# Patient Record
Sex: MALE | Race: WHITE | NOT HISPANIC OR LATINO | Employment: OTHER | URBAN - METROPOLITAN AREA
[De-identification: names, ages, dates, MRNs, and addresses within clinical notes are randomized per-mention and may not be internally consistent; named-entity substitution may affect disease eponyms.]

---

## 2023-01-31 PROBLEM — R73.9 BORDERLINE HYPERGLYCEMIA: Status: ACTIVE | Noted: 2023-01-31

## 2023-01-31 PROBLEM — M51.9 LUMBAR DISC DISEASE: Status: ACTIVE | Noted: 2023-01-31

## 2023-01-31 PROBLEM — E78.5 HYPERLIPIDEMIA: Status: ACTIVE | Noted: 2023-01-31

## 2023-01-31 PROBLEM — L72.3 SEBACEOUS CYST: Status: ACTIVE | Noted: 2023-01-31

## 2023-01-31 PROBLEM — D12.6 COLON ADENOMAS: Status: ACTIVE | Noted: 2023-01-31

## 2023-01-31 PROBLEM — I10 BENIGN ESSENTIAL HYPERTENSION: Status: ACTIVE | Noted: 2023-01-31

## 2023-01-31 PROBLEM — E78.00 HYPERCHOLESTEROLEMIA: Status: ACTIVE | Noted: 2023-01-31

## 2023-01-31 PROBLEM — M67.40 GANGLION CYST: Status: ACTIVE | Noted: 2023-01-31

## 2023-01-31 PROBLEM — T14.8XXA MUSCLE STRAIN: Status: ACTIVE | Noted: 2023-01-31

## 2023-01-31 PROBLEM — M54.16 LUMBAR RADICULOPATHY: Status: ACTIVE | Noted: 2023-01-31

## 2023-01-31 PROBLEM — N52.9 MALE ERECTILE DISORDER OF ORGANIC ORIGIN: Status: ACTIVE | Noted: 2023-01-31

## 2023-01-31 PROBLEM — K42.9 UMBILICAL HERNIA: Status: ACTIVE | Noted: 2023-01-31

## 2023-01-31 PROBLEM — M19.90 OSTEOARTHRITIS: Status: ACTIVE | Noted: 2023-01-31

## 2023-01-31 PROBLEM — N40.0 BPH (BENIGN PROSTATIC HYPERPLASIA): Status: ACTIVE | Noted: 2023-01-31

## 2023-01-31 PROBLEM — M54.32 SCIATIC PAIN, LEFT: Status: ACTIVE | Noted: 2023-01-31

## 2023-01-31 RX ORDER — LISINOPRIL 10 MG/1
10 TABLET ORAL DAILY
COMMUNITY
Start: 2014-05-16 | End: 2023-03-15 | Stop reason: SDUPTHER

## 2023-01-31 RX ORDER — CIPROFLOXACIN 500 MG/1
500 TABLET ORAL
COMMUNITY
End: 2023-03-15 | Stop reason: ALTCHOICE

## 2023-01-31 RX ORDER — TADALAFIL 20 MG/1
20 TABLET ORAL DAILY PRN
COMMUNITY
End: 2024-01-25 | Stop reason: SDUPTHER

## 2023-03-15 ENCOUNTER — OFFICE VISIT (OUTPATIENT)
Dept: PRIMARY CARE | Facility: CLINIC | Age: 73
End: 2023-03-15
Payer: MEDICARE

## 2023-03-15 VITALS
TEMPERATURE: 96.6 F | BODY MASS INDEX: 25.86 KG/M2 | WEIGHT: 208 LBS | RESPIRATION RATE: 15 BRPM | SYSTOLIC BLOOD PRESSURE: 122 MMHG | HEART RATE: 67 BPM | OXYGEN SATURATION: 98 % | DIASTOLIC BLOOD PRESSURE: 72 MMHG | HEIGHT: 75 IN

## 2023-03-15 DIAGNOSIS — N52.9 MALE ERECTILE DISORDER OF ORGANIC ORIGIN: ICD-10-CM

## 2023-03-15 DIAGNOSIS — I10 BENIGN ESSENTIAL HYPERTENSION: ICD-10-CM

## 2023-03-15 DIAGNOSIS — Z00.00 MEDICARE ANNUAL WELLNESS VISIT, SUBSEQUENT: Primary | ICD-10-CM

## 2023-03-15 DIAGNOSIS — C61 PROSTATE CANCER (MULTI): Chronic | ICD-10-CM

## 2023-03-15 DIAGNOSIS — E78.2 MIXED HYPERLIPIDEMIA: ICD-10-CM

## 2023-03-15 PROCEDURE — 1170F FXNL STATUS ASSESSED: CPT | Performed by: STUDENT IN AN ORGANIZED HEALTH CARE EDUCATION/TRAINING PROGRAM

## 2023-03-15 PROCEDURE — 99214 OFFICE O/P EST MOD 30 MIN: CPT | Performed by: STUDENT IN AN ORGANIZED HEALTH CARE EDUCATION/TRAINING PROGRAM

## 2023-03-15 PROCEDURE — 1160F RVW MEDS BY RX/DR IN RCRD: CPT | Performed by: STUDENT IN AN ORGANIZED HEALTH CARE EDUCATION/TRAINING PROGRAM

## 2023-03-15 PROCEDURE — 1157F ADVNC CARE PLAN IN RCRD: CPT | Performed by: STUDENT IN AN ORGANIZED HEALTH CARE EDUCATION/TRAINING PROGRAM

## 2023-03-15 PROCEDURE — 1036F TOBACCO NON-USER: CPT | Performed by: STUDENT IN AN ORGANIZED HEALTH CARE EDUCATION/TRAINING PROGRAM

## 2023-03-15 PROCEDURE — 3074F SYST BP LT 130 MM HG: CPT | Performed by: STUDENT IN AN ORGANIZED HEALTH CARE EDUCATION/TRAINING PROGRAM

## 2023-03-15 PROCEDURE — G0439 PPPS, SUBSEQ VISIT: HCPCS | Performed by: STUDENT IN AN ORGANIZED HEALTH CARE EDUCATION/TRAINING PROGRAM

## 2023-03-15 PROCEDURE — 1159F MED LIST DOCD IN RCRD: CPT | Performed by: STUDENT IN AN ORGANIZED HEALTH CARE EDUCATION/TRAINING PROGRAM

## 2023-03-15 PROCEDURE — 3078F DIAST BP <80 MM HG: CPT | Performed by: STUDENT IN AN ORGANIZED HEALTH CARE EDUCATION/TRAINING PROGRAM

## 2023-03-15 RX ORDER — LISINOPRIL 10 MG/1
10 TABLET ORAL DAILY
Qty: 90 TABLET | Refills: 1 | Status: SHIPPED | OUTPATIENT
Start: 2023-03-15 | End: 2023-09-12 | Stop reason: SDUPTHER

## 2023-03-15 ASSESSMENT — ENCOUNTER SYMPTOMS
SHORTNESS OF BREATH: 0
FATIGUE: 0
LOSS OF SENSATION IN FEET: 0
ABDOMINAL PAIN: 0
DEPRESSION: 0
FEVER: 0
DIARRHEA: 0
DIZZINESS: 0
OCCASIONAL FEELINGS OF UNSTEADINESS: 0
HYPERTENSION: 1

## 2023-03-15 ASSESSMENT — ACTIVITIES OF DAILY LIVING (ADL)
DRESSING: INDEPENDENT
MANAGING_FINANCES: INDEPENDENT
DOING_HOUSEWORK: INDEPENDENT
BATHING: INDEPENDENT
TAKING_MEDICATION: INDEPENDENT
GROCERY_SHOPPING: INDEPENDENT

## 2023-03-15 ASSESSMENT — PATIENT HEALTH QUESTIONNAIRE - PHQ9
2. FEELING DOWN, DEPRESSED OR HOPELESS: NOT AT ALL
1. LITTLE INTEREST OR PLEASURE IN DOING THINGS: NOT AT ALL
SUM OF ALL RESPONSES TO PHQ9 QUESTIONS 1 AND 2: 0

## 2023-03-15 NOTE — PROGRESS NOTES
"Subjective   Reason for Visit: Faustino Isaacs Jr. is an 72 y.o. male here for a Medicare Wellness visit.     Past Medical, Surgical, and Family History reviewed and updated in chart.    Reviewed all medications by prescribing practitioner or clinical pharmacist (such as prescriptions, OTCs, herbal therapies and supplements) and documented in the medical record.    Interval Hx: PSA was trickling upward  S/p BX which was positive: Prostatic adenocarcinoma, which involved 70% of the sample  Pending repeat PSA in 3 months   Following with Dr. Ghandour     Hypertension  This is a chronic problem. The current episode started more than 1 year ago. The problem is unchanged. The problem is controlled. Pertinent negatives include no chest pain or shortness of breath.       Patient Self Assessment of Health Status  Patient Self Assessment: Good    Nutrition and Exercise  Current Diet: Well Balanced Diet  Adequate Fluid Intake: Yes  Caffeine: Yes (1 cup in the AM)  Exercise Frequency: Regularly    Functional Ability/Level of Safety  Cognitive Impairment Observed: No cognitive impairment observed  Cognitive Impairment Reported: No cognitive impairment reported by patient or family    Home Safety Risk Factors: None    Patient Care Team:  Susy Mason DO as PCP - General  Susy Mason DO as PCP - [employer] PCP     Review of Systems   Constitutional:  Negative for fatigue and fever.   HENT:  Negative for congestion and ear pain.    Respiratory:  Negative for shortness of breath.    Cardiovascular:  Negative for chest pain.   Gastrointestinal:  Negative for abdominal pain and diarrhea.   Neurological:  Negative for dizziness.       Objective   Vitals:  /72   Pulse 67   Temp 35.9 °C (96.6 °F)   Resp 15   Ht 1.905 m (6' 3\")   Wt 94.3 kg (208 lb)   SpO2 98%   BMI 26.00 kg/m²       Physical Exam  Vitals reviewed.   Constitutional:       General: He is not in acute distress.     Appearance: He is not " ill-appearing.   HENT:      Right Ear: Tympanic membrane and ear canal normal.      Left Ear: Tympanic membrane and ear canal normal.      Mouth/Throat:      Mouth: Mucous membranes are moist.      Pharynx: Oropharynx is clear. No oropharyngeal exudate or posterior oropharyngeal erythema.   Eyes:      Extraocular Movements: Extraocular movements intact.      Conjunctiva/sclera: Conjunctivae normal.      Pupils: Pupils are equal, round, and reactive to light.   Neck:      Vascular: No carotid bruit.   Cardiovascular:      Rate and Rhythm: Normal rate and regular rhythm.      Heart sounds: Murmur (SYSTOLIC) heard.      No gallop.   Pulmonary:      Effort: Pulmonary effort is normal.      Breath sounds: Normal breath sounds. No wheezing, rhonchi or rales.   Abdominal:      General: Abdomen is flat. Bowel sounds are normal.      Palpations: Abdomen is soft.      Tenderness: There is no abdominal tenderness.   Musculoskeletal:      Cervical back: Neck supple.      Left lower leg: No edema.   Skin:     General: Skin is warm and dry.      Findings: No rash.   Neurological:      General: No focal deficit present.      Mental Status: He is alert and oriented to person, place, and time.      Gait: Gait normal.   Psychiatric:         Mood and Affect: Mood normal.         Behavior: Behavior normal.         Assessment/Plan   Problem List Items Addressed This Visit          Circulatory    Benign essential hypertension    Current Assessment & Plan     Doing well  Stable, continue current medications          Relevant Medications    lisinopril 10 mg tablet    Other Relevant Orders    CBC and Auto Differential    Comprehensive metabolic panel    Albumin , Urine Random       Genitourinary    Prostate cancer (CMS/HCC) (Chronic)    Overview     Bx postiive for adenocarcinoma of the prostate   following with urology   pending repeat PSA         Male erectile disorder of organic origin       Other    Hyperlipidemia    Relevant Orders     Lipid Panel    Medicare annual wellness visit, subsequent - Primary

## 2023-03-15 NOTE — PROGRESS NOTES
"Subjective   Reason for Visit: Faustino Isaacs Jr. is an 72 y.o. male here for a Medicare Wellness visit.     Past Medical, Surgical, and Family History reviewed and updated in chart.    Reviewed all medications by prescribing practitioner or clinical pharmacist (such as prescriptions, OTCs, herbal therapies and supplements) and documented in the medical record.    HPI    Patient Self Assessment of Health Status  Patient Self Assessment: Good    Nutrition and Exercise  Current Diet: Well Balanced Diet  Adequate Fluid Intake: Yes  Caffeine: Yes (1 cup in the AM)  Exercise Frequency: Regularly    Functional Ability/Level of Safety  Cognitive Impairment Observed: No cognitive impairment observed  Cognitive Impairment Reported: No cognitive impairment reported by patient or family    Home Safety Risk Factors: None    Patient Care Team:  Susy Mason DO as PCP - General  Susy Mason DO as PCP - [employer] PCP     Review of Systems    Objective   Vitals:  /72   Pulse 67   Temp 35.9 °C (96.6 °F)   Resp 15   Ht 1.905 m (6' 3\")   Wt 94.3 kg (208 lb)   SpO2 98%   BMI 26.00 kg/m²       Physical Exam    Assessment/Plan   Problem List Items Addressed This Visit        Circulatory    Benign essential hypertension    Current Assessment & Plan     Doing well  Stable, continue current medications          Relevant Medications    lisinopril 10 mg tablet    Other Relevant Orders    CBC and Auto Differential    Comprehensive metabolic panel    Albumin , Urine Random       Genitourinary    Prostate cancer (CMS/HCC) (Chronic)    Overview     Bx postiive for adenocarcinoma of the prostate   following with urology   pending repeat PSA         Male erectile disorder of organic origin       Other    Hyperlipidemia    Relevant Orders    Lipid Panel    Medicare annual wellness visit, subsequent - Primary          "

## 2023-03-16 ENCOUNTER — LAB (OUTPATIENT)
Dept: LAB | Facility: LAB | Age: 73
End: 2023-03-16
Payer: MEDICARE

## 2023-03-16 ENCOUNTER — TELEPHONE (OUTPATIENT)
Dept: PRIMARY CARE | Facility: CLINIC | Age: 73
End: 2023-03-16

## 2023-03-16 DIAGNOSIS — I10 BENIGN ESSENTIAL HYPERTENSION: ICD-10-CM

## 2023-03-16 DIAGNOSIS — E78.2 MIXED HYPERLIPIDEMIA: ICD-10-CM

## 2023-03-16 LAB
ALANINE AMINOTRANSFERASE (SGPT) (U/L) IN SER/PLAS: 19 U/L (ref 10–52)
ALBUMIN (G/DL) IN SER/PLAS: 4.6 G/DL (ref 3.4–5)
ALBUMIN (MG/L) IN URINE: <7 MG/L
ALBUMIN/CREATININE (UG/MG) IN URINE: NORMAL UG/MG CRT (ref 0–30)
ALKALINE PHOSPHATASE (U/L) IN SER/PLAS: 69 U/L (ref 33–136)
ANION GAP IN SER/PLAS: 12 MMOL/L (ref 10–20)
ASPARTATE AMINOTRANSFERASE (SGOT) (U/L) IN SER/PLAS: 22 U/L (ref 9–39)
BASOPHILS (10*3/UL) IN BLOOD BY AUTOMATED COUNT: 0.01 X10E9/L (ref 0–0.1)
BASOPHILS/100 LEUKOCYTES IN BLOOD BY AUTOMATED COUNT: 0.2 % (ref 0–2)
BILIRUBIN TOTAL (MG/DL) IN SER/PLAS: 0.7 MG/DL (ref 0–1.2)
CALCIUM (MG/DL) IN SER/PLAS: 9.7 MG/DL (ref 8.6–10.6)
CARBON DIOXIDE, TOTAL (MMOL/L) IN SER/PLAS: 29 MMOL/L (ref 21–32)
CHLORIDE (MMOL/L) IN SER/PLAS: 105 MMOL/L (ref 98–107)
CHOLESTEROL (MG/DL) IN SER/PLAS: 188 MG/DL (ref 0–199)
CHOLESTEROL IN HDL (MG/DL) IN SER/PLAS: 47.2 MG/DL
CHOLESTEROL/HDL RATIO: 4
CREATININE (MG/DL) IN SER/PLAS: 0.94 MG/DL (ref 0.5–1.3)
CREATININE (MG/DL) IN URINE: 68.1 MG/DL (ref 20–370)
EOSINOPHILS (10*3/UL) IN BLOOD BY AUTOMATED COUNT: 0.08 X10E9/L (ref 0–0.4)
EOSINOPHILS/100 LEUKOCYTES IN BLOOD BY AUTOMATED COUNT: 1.7 % (ref 0–6)
ERYTHROCYTE DISTRIBUTION WIDTH (RATIO) BY AUTOMATED COUNT: 11.9 % (ref 11.5–14.5)
ERYTHROCYTE MEAN CORPUSCULAR HEMOGLOBIN CONCENTRATION (G/DL) BY AUTOMATED: 33.7 G/DL (ref 32–36)
ERYTHROCYTE MEAN CORPUSCULAR VOLUME (FL) BY AUTOMATED COUNT: 99 FL (ref 80–100)
ERYTHROCYTES (10*6/UL) IN BLOOD BY AUTOMATED COUNT: 4.12 X10E12/L (ref 4.5–5.9)
GFR MALE: 86 ML/MIN/1.73M2
GLUCOSE (MG/DL) IN SER/PLAS: 96 MG/DL (ref 74–99)
HEMATOCRIT (%) IN BLOOD BY AUTOMATED COUNT: 40.7 % (ref 41–52)
HEMOGLOBIN (G/DL) IN BLOOD: 13.7 G/DL (ref 13.5–17.5)
IMMATURE GRANULOCYTES/100 LEUKOCYTES IN BLOOD BY AUTOMATED COUNT: 0.2 % (ref 0–0.9)
LDL: 118 MG/DL (ref 0–99)
LEUKOCYTES (10*3/UL) IN BLOOD BY AUTOMATED COUNT: 4.6 X10E9/L (ref 4.4–11.3)
LYMPHOCYTES (10*3/UL) IN BLOOD BY AUTOMATED COUNT: 1.48 X10E9/L (ref 0.8–3)
LYMPHOCYTES/100 LEUKOCYTES IN BLOOD BY AUTOMATED COUNT: 32.2 % (ref 13–44)
MONOCYTES (10*3/UL) IN BLOOD BY AUTOMATED COUNT: 0.48 X10E9/L (ref 0.05–0.8)
MONOCYTES/100 LEUKOCYTES IN BLOOD BY AUTOMATED COUNT: 10.4 % (ref 2–10)
NEUTROPHILS (10*3/UL) IN BLOOD BY AUTOMATED COUNT: 2.54 X10E9/L (ref 1.6–5.5)
NEUTROPHILS/100 LEUKOCYTES IN BLOOD BY AUTOMATED COUNT: 55.3 % (ref 40–80)
NRBC (PER 100 WBCS) BY AUTOMATED COUNT: 0 /100 WBC (ref 0–0)
PLATELETS (10*3/UL) IN BLOOD AUTOMATED COUNT: 239 X10E9/L (ref 150–450)
POTASSIUM (MMOL/L) IN SER/PLAS: 4.6 MMOL/L (ref 3.5–5.3)
PROTEIN TOTAL: 7.1 G/DL (ref 6.4–8.2)
SODIUM (MMOL/L) IN SER/PLAS: 141 MMOL/L (ref 136–145)
TRIGLYCERIDE (MG/DL) IN SER/PLAS: 112 MG/DL (ref 0–149)
UREA NITROGEN (MG/DL) IN SER/PLAS: 22 MG/DL (ref 6–23)
VLDL: 22 MG/DL (ref 0–40)

## 2023-03-16 PROCEDURE — 82570 ASSAY OF URINE CREATININE: CPT

## 2023-03-16 PROCEDURE — 85025 COMPLETE CBC W/AUTO DIFF WBC: CPT

## 2023-03-16 PROCEDURE — 80061 LIPID PANEL: CPT

## 2023-03-16 PROCEDURE — 82043 UR ALBUMIN QUANTITATIVE: CPT

## 2023-03-16 PROCEDURE — 36415 COLL VENOUS BLD VENIPUNCTURE: CPT

## 2023-03-16 PROCEDURE — 80053 COMPREHEN METABOLIC PANEL: CPT

## 2023-03-16 NOTE — TELEPHONE ENCOUNTER
----- Message from Susy Mason DO sent at 3/16/2023  3:02 PM EDT -----  Blood work looks good! Cholesterol, kidney, liver numbers are normal

## 2023-03-21 ENCOUNTER — TELEPHONE (OUTPATIENT)
Dept: PRIMARY CARE | Facility: CLINIC | Age: 73
End: 2023-03-21
Payer: MEDICARE

## 2023-05-30 LAB — PROSTATE SPECIFIC AG (NG/ML) IN SER/PLAS: 1.3 NG/ML (ref 0–4)

## 2023-09-12 DIAGNOSIS — I10 BENIGN ESSENTIAL HYPERTENSION: ICD-10-CM

## 2023-09-12 RX ORDER — LISINOPRIL 10 MG/1
10 TABLET ORAL DAILY
Qty: 90 TABLET | Refills: 1 | Status: SHIPPED | OUTPATIENT
Start: 2023-09-12 | End: 2023-09-18 | Stop reason: SDUPTHER

## 2023-09-13 PROBLEM — R97.20 ELEVATED PSA: Status: ACTIVE | Noted: 2023-09-13

## 2023-09-13 PROBLEM — C61 PROSTATE CANCER (MULTI): Status: ACTIVE | Noted: 2023-03-15

## 2023-09-18 ENCOUNTER — OFFICE VISIT (OUTPATIENT)
Dept: PRIMARY CARE | Facility: CLINIC | Age: 73
End: 2023-09-18
Payer: MEDICARE

## 2023-09-18 ENCOUNTER — LAB (OUTPATIENT)
Dept: LAB | Facility: LAB | Age: 73
End: 2023-09-18
Payer: MEDICARE

## 2023-09-18 VITALS
HEIGHT: 76 IN | OXYGEN SATURATION: 96 % | WEIGHT: 207.6 LBS | HEART RATE: 50 BPM | BODY MASS INDEX: 25.28 KG/M2 | DIASTOLIC BLOOD PRESSURE: 66 MMHG | RESPIRATION RATE: 18 BRPM | SYSTOLIC BLOOD PRESSURE: 126 MMHG

## 2023-09-18 DIAGNOSIS — I10 BENIGN ESSENTIAL HYPERTENSION: ICD-10-CM

## 2023-09-18 DIAGNOSIS — E78.00 HYPERCHOLESTEROLEMIA: ICD-10-CM

## 2023-09-18 DIAGNOSIS — Z23 ENCOUNTER FOR IMMUNIZATION: ICD-10-CM

## 2023-09-18 DIAGNOSIS — I10 BENIGN ESSENTIAL HYPERTENSION: Primary | ICD-10-CM

## 2023-09-18 LAB
ALANINE AMINOTRANSFERASE (SGPT) (U/L) IN SER/PLAS: 23 U/L (ref 10–52)
ALBUMIN (G/DL) IN SER/PLAS: 4.7 G/DL (ref 3.4–5)
ALKALINE PHOSPHATASE (U/L) IN SER/PLAS: 68 U/L (ref 33–136)
ANION GAP IN SER/PLAS: 13 MMOL/L (ref 10–20)
ASPARTATE AMINOTRANSFERASE (SGOT) (U/L) IN SER/PLAS: 27 U/L (ref 9–39)
BASOPHILS (10*3/UL) IN BLOOD BY AUTOMATED COUNT: 0.01 X10E9/L (ref 0–0.1)
BASOPHILS/100 LEUKOCYTES IN BLOOD BY AUTOMATED COUNT: 0.2 % (ref 0–2)
BILIRUBIN TOTAL (MG/DL) IN SER/PLAS: 0.8 MG/DL (ref 0–1.2)
CALCIUM (MG/DL) IN SER/PLAS: 9.9 MG/DL (ref 8.6–10.3)
CARBON DIOXIDE, TOTAL (MMOL/L) IN SER/PLAS: 29 MMOL/L (ref 21–32)
CHLORIDE (MMOL/L) IN SER/PLAS: 101 MMOL/L (ref 98–107)
CREATININE (MG/DL) IN SER/PLAS: 0.89 MG/DL (ref 0.5–1.3)
EOSINOPHILS (10*3/UL) IN BLOOD BY AUTOMATED COUNT: 0.09 X10E9/L (ref 0–0.4)
EOSINOPHILS/100 LEUKOCYTES IN BLOOD BY AUTOMATED COUNT: 2.2 % (ref 0–6)
ERYTHROCYTE DISTRIBUTION WIDTH (RATIO) BY AUTOMATED COUNT: 12.4 % (ref 11.5–14.5)
ERYTHROCYTE MEAN CORPUSCULAR HEMOGLOBIN CONCENTRATION (G/DL) BY AUTOMATED: 33.1 G/DL (ref 32–36)
ERYTHROCYTE MEAN CORPUSCULAR VOLUME (FL) BY AUTOMATED COUNT: 98 FL (ref 80–100)
ERYTHROCYTES (10*6/UL) IN BLOOD BY AUTOMATED COUNT: 4.18 X10E12/L (ref 4.5–5.9)
GFR MALE: 90 ML/MIN/1.73M2
GLUCOSE (MG/DL) IN SER/PLAS: 109 MG/DL (ref 74–99)
HEMATOCRIT (%) IN BLOOD BY AUTOMATED COUNT: 41.1 % (ref 41–52)
HEMOGLOBIN (G/DL) IN BLOOD: 13.6 G/DL (ref 13.5–17.5)
IMMATURE GRANULOCYTES/100 LEUKOCYTES IN BLOOD BY AUTOMATED COUNT: 0.2 % (ref 0–0.9)
LEUKOCYTES (10*3/UL) IN BLOOD BY AUTOMATED COUNT: 4.1 X10E9/L (ref 4.4–11.3)
LYMPHOCYTES (10*3/UL) IN BLOOD BY AUTOMATED COUNT: 1.11 X10E9/L (ref 0.8–3)
LYMPHOCYTES/100 LEUKOCYTES IN BLOOD BY AUTOMATED COUNT: 26.9 % (ref 13–44)
MONOCYTES (10*3/UL) IN BLOOD BY AUTOMATED COUNT: 0.4 X10E9/L (ref 0.05–0.8)
MONOCYTES/100 LEUKOCYTES IN BLOOD BY AUTOMATED COUNT: 9.7 % (ref 2–10)
NEUTROPHILS (10*3/UL) IN BLOOD BY AUTOMATED COUNT: 2.51 X10E9/L (ref 1.6–5.5)
NEUTROPHILS/100 LEUKOCYTES IN BLOOD BY AUTOMATED COUNT: 60.8 % (ref 40–80)
PLATELETS (10*3/UL) IN BLOOD AUTOMATED COUNT: 253 X10E9/L (ref 150–450)
POTASSIUM (MMOL/L) IN SER/PLAS: 4.9 MMOL/L (ref 3.5–5.3)
PROTEIN TOTAL: 7.2 G/DL (ref 6.4–8.2)
SODIUM (MMOL/L) IN SER/PLAS: 138 MMOL/L (ref 136–145)
UREA NITROGEN (MG/DL) IN SER/PLAS: 18 MG/DL (ref 6–23)

## 2023-09-18 PROCEDURE — 3074F SYST BP LT 130 MM HG: CPT | Performed by: STUDENT IN AN ORGANIZED HEALTH CARE EDUCATION/TRAINING PROGRAM

## 2023-09-18 PROCEDURE — 1036F TOBACCO NON-USER: CPT | Performed by: STUDENT IN AN ORGANIZED HEALTH CARE EDUCATION/TRAINING PROGRAM

## 2023-09-18 PROCEDURE — 1160F RVW MEDS BY RX/DR IN RCRD: CPT | Performed by: STUDENT IN AN ORGANIZED HEALTH CARE EDUCATION/TRAINING PROGRAM

## 2023-09-18 PROCEDURE — 80053 COMPREHEN METABOLIC PANEL: CPT

## 2023-09-18 PROCEDURE — 99213 OFFICE O/P EST LOW 20 MIN: CPT | Performed by: STUDENT IN AN ORGANIZED HEALTH CARE EDUCATION/TRAINING PROGRAM

## 2023-09-18 PROCEDURE — 3078F DIAST BP <80 MM HG: CPT | Performed by: STUDENT IN AN ORGANIZED HEALTH CARE EDUCATION/TRAINING PROGRAM

## 2023-09-18 PROCEDURE — 90662 IIV NO PRSV INCREASED AG IM: CPT | Performed by: STUDENT IN AN ORGANIZED HEALTH CARE EDUCATION/TRAINING PROGRAM

## 2023-09-18 PROCEDURE — 36415 COLL VENOUS BLD VENIPUNCTURE: CPT

## 2023-09-18 PROCEDURE — 1157F ADVNC CARE PLAN IN RCRD: CPT | Performed by: STUDENT IN AN ORGANIZED HEALTH CARE EDUCATION/TRAINING PROGRAM

## 2023-09-18 PROCEDURE — G0008 ADMIN INFLUENZA VIRUS VAC: HCPCS | Performed by: STUDENT IN AN ORGANIZED HEALTH CARE EDUCATION/TRAINING PROGRAM

## 2023-09-18 PROCEDURE — 1159F MED LIST DOCD IN RCRD: CPT | Performed by: STUDENT IN AN ORGANIZED HEALTH CARE EDUCATION/TRAINING PROGRAM

## 2023-09-18 PROCEDURE — 85025 COMPLETE CBC W/AUTO DIFF WBC: CPT

## 2023-09-18 RX ORDER — LISINOPRIL 10 MG/1
10 TABLET ORAL DAILY
Qty: 90 TABLET | Refills: 1 | Status: SHIPPED | OUTPATIENT
Start: 2023-09-18 | End: 2024-03-19 | Stop reason: SDUPTHER

## 2023-09-18 ASSESSMENT — ENCOUNTER SYMPTOMS
HYPERTENSION: 1
SHORTNESS OF BREATH: 0
CONSTIPATION: 0
DIARRHEA: 0

## 2023-09-18 ASSESSMENT — PATIENT HEALTH QUESTIONNAIRE - PHQ9
1. LITTLE INTEREST OR PLEASURE IN DOING THINGS: NOT AT ALL
2. FEELING DOWN, DEPRESSED OR HOPELESS: NOT AT ALL
SUM OF ALL RESPONSES TO PHQ9 QUESTIONS 1 AND 2: 0

## 2023-09-18 NOTE — PROGRESS NOTES
Subjective   Patient ID: Faustino Isaacs Jr. is a 73 y.o. male who presents for Hypertension (Pt is here for a 6 month HTN follow up.).    Patient presents to the office today for hypertension follow-up.  Patient states that he has no concerns or questions.  He has not had any dizziness, chest pain, shortness of breath and his blood pressure has remained relatively stable.  Patient will be having an MRI of his prostate due to a fluctuation in his PSA at the end of the year with urology.    Hypertension  Pertinent negatives include no shortness of breath.       Review of Systems   HENT:  Negative for congestion.    Respiratory:  Negative for shortness of breath.    Gastrointestinal:  Negative for constipation and diarrhea.       Objective   Physical Exam  Vitals reviewed.   Constitutional:       Appearance: Normal appearance.   Cardiovascular:      Rate and Rhythm: Normal rate and regular rhythm.      Heart sounds: No murmur heard.  Pulmonary:      Effort: Pulmonary effort is normal. No respiratory distress.      Breath sounds: Normal breath sounds. No wheezing.   Musculoskeletal:      Cervical back: Neck supple.      Left lower leg: No edema.   Neurological:      Mental Status: He is alert.       Assessment/Plan   Problem List Items Addressed This Visit       Benign essential hypertension - Primary     Lisinopril 10mg daily   Physical exam was stable. Discussed maintaining diets such as DASH to help maintain normal Blood pressure. Encouraged regular exercise for a total of 120 min weekly. We will fu labs in 1-3 days. For now there will be no change in medical management. All questions and concerns were addressed. Pt will follow up in 4-6 months or sooner if needed.            Relevant Medications    lisinopril 10 mg tablet    Other Relevant Orders    CBC and Auto Differential    Comprehensive metabolic panel    Hypercholesterolemia     Doing well             Other Visit Diagnoses       Encounter for immunization         Relevant Orders    Flu vaccine, quadrivalent, high-dose, preservative free, age 65y+ (FLUZONE)

## 2023-09-18 NOTE — ASSESSMENT & PLAN NOTE
Lisinopril 10mg daily   Physical exam was stable. Discussed maintaining diets such as DASH to help maintain normal Blood pressure. Encouraged regular exercise for a total of 120 min weekly. We will fu labs in 1-3 days. For now there will be no change in medical management. All questions and concerns were addressed. Pt will follow up in 4-6 months or sooner if needed.

## 2023-11-27 ENCOUNTER — HOSPITAL ENCOUNTER (OUTPATIENT)
Dept: RADIOLOGY | Facility: HOSPITAL | Age: 73
Discharge: HOME | End: 2023-11-27
Payer: MEDICARE

## 2023-11-27 DIAGNOSIS — R97.20 ELEVATED PROSTATE SPECIFIC ANTIGEN (PSA): ICD-10-CM

## 2023-11-27 PROCEDURE — A9575 INJ GADOTERATE MEGLUMI 0.1ML: HCPCS | Performed by: STUDENT IN AN ORGANIZED HEALTH CARE EDUCATION/TRAINING PROGRAM

## 2023-11-27 PROCEDURE — 72197 MRI PELVIS W/O & W/DYE: CPT

## 2023-11-27 PROCEDURE — 76498 UNLISTED MR PROCEDURE: CPT | Performed by: STUDENT IN AN ORGANIZED HEALTH CARE EDUCATION/TRAINING PROGRAM

## 2023-11-27 PROCEDURE — 2550000001 HC RX 255 CONTRASTS: Performed by: STUDENT IN AN ORGANIZED HEALTH CARE EDUCATION/TRAINING PROGRAM

## 2023-11-27 PROCEDURE — 72197 MRI PELVIS W/O & W/DYE: CPT | Performed by: STUDENT IN AN ORGANIZED HEALTH CARE EDUCATION/TRAINING PROGRAM

## 2023-11-27 RX ORDER — GADOTERATE MEGLUMINE 376.9 MG/ML
0.2 INJECTION INTRAVENOUS
Status: COMPLETED | OUTPATIENT
Start: 2023-11-27 | End: 2023-11-27

## 2023-11-27 RX ADMIN — GADOTERATE MEGLUMINE 18 ML: 376.9 INJECTION INTRAVENOUS at 10:30

## 2023-12-01 ENCOUNTER — LAB (OUTPATIENT)
Dept: LAB | Facility: LAB | Age: 73
End: 2023-12-01
Payer: MEDICARE

## 2023-12-01 DIAGNOSIS — R97.20 ELEVATED PROSTATE SPECIFIC ANTIGEN (PSA): Primary | ICD-10-CM

## 2023-12-01 LAB — PSA SERPL-MCNC: 2.18 NG/ML

## 2023-12-01 PROCEDURE — 84153 ASSAY OF PSA TOTAL: CPT

## 2023-12-01 PROCEDURE — 36415 COLL VENOUS BLD VENIPUNCTURE: CPT

## 2023-12-11 ENCOUNTER — APPOINTMENT (OUTPATIENT)
Dept: UROLOGY | Facility: CLINIC | Age: 73
End: 2023-12-11
Payer: MEDICARE

## 2023-12-18 ENCOUNTER — OFFICE VISIT (OUTPATIENT)
Dept: UROLOGY | Facility: CLINIC | Age: 73
End: 2023-12-18
Payer: MEDICARE

## 2023-12-18 DIAGNOSIS — N40.1 BENIGN PROSTATIC HYPERPLASIA WITH LOWER URINARY TRACT SYMPTOMS, SYMPTOM DETAILS UNSPECIFIED: ICD-10-CM

## 2023-12-18 DIAGNOSIS — R97.20 ELEVATED PSA: ICD-10-CM

## 2023-12-18 DIAGNOSIS — N52.9 ERECTILE DYSFUNCTION, UNSPECIFIED ERECTILE DYSFUNCTION TYPE: ICD-10-CM

## 2023-12-18 DIAGNOSIS — C61 PROSTATE CANCER (MULTI): Primary | ICD-10-CM

## 2023-12-18 PROCEDURE — 1036F TOBACCO NON-USER: CPT | Performed by: STUDENT IN AN ORGANIZED HEALTH CARE EDUCATION/TRAINING PROGRAM

## 2023-12-18 PROCEDURE — 99214 OFFICE O/P EST MOD 30 MIN: CPT | Performed by: STUDENT IN AN ORGANIZED HEALTH CARE EDUCATION/TRAINING PROGRAM

## 2023-12-18 PROCEDURE — 1160F RVW MEDS BY RX/DR IN RCRD: CPT | Performed by: STUDENT IN AN ORGANIZED HEALTH CARE EDUCATION/TRAINING PROGRAM

## 2023-12-18 PROCEDURE — 1159F MED LIST DOCD IN RCRD: CPT | Performed by: STUDENT IN AN ORGANIZED HEALTH CARE EDUCATION/TRAINING PROGRAM

## 2023-12-18 RX ORDER — TADALAFIL 5 MG/1
5 TABLET ORAL DAILY
Qty: 30 TABLET | Refills: 11 | Status: SHIPPED | OUTPATIENT
Start: 2023-12-18 | End: 2024-01-25 | Stop reason: SDUPTHER

## 2023-12-18 NOTE — PROGRESS NOTES
Subjective   Patient ID: Faustino Isaacs Jr. is a 73 y.o. male.    HPI  Patient continues to be on active surveillance, with a surveillance, with repeat PSA of 2.18.   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 He is comfortable with this.     MRI shows a 0.9cm PIRADS 4 lesion on dec 2022.     He also he also reports that he has been using tadalafil 20mg on demand with no success.    MRI prostate 11/27/23:     IMPRESSION:  1.  Similar appearance of a PI-RADS 4 lesion of the left  posterolateral peripheral zone at mid gland.  2. A PI-RADS 3 lesion in the medial left peripheral zone  posteromedial peripheral zone of the base of the prostate, which  appear slightly more prominent compared to 2022 MRI.  3. 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      PI-RADS 4 - High (clinically significant cancer is likely to be  present).    Lab Results   Component Value Date    PSA 2.18 12/01/2023    PSA 1.30 05/30/2023    PSA 3.4 10/04/2022    PSA 1.13 10/05/2021    PSA 0.91 06/27/2018         Review of Systems   All other systems reviewed and are negative.      Objective   Physical Exam  Vitals reviewed.     Assessment/Plan   73-year-old male with GG 2 prostate cancer, elected to go with active surveillance, PSA now down to 1.3. Also has ED and not happy with response to tadalafil 20 mg on demand.    I personally reviewed the MRI results from November 2023. This showed similar appearance of a PI-RADS 4 lesion of the left posterolateral peripheral zone at mid gland. A PI-RADS 3 lesion in the medial left peripheral zone posteromedial peripheral zone of the base of the prostate, which  appear slightly more prominent compared to 2022 MRI.     We discussed the role of continued active surveillance, repeat biopsy, and treatment, and the risks and benefits of each. For now he is comfortable to continue monitoring the PSA, possibly repeat the MRI at the yearly hortencia, and repeat biopsy after next MRI if  there is any sign of progression.    At this point he is comfortable with waiting. Will repeat PSA in 6 months. MRI in 1 year.     Regarding the ED, I offered a daily tadalafil 5 mg which could help with urinary symptoms, but could also increase the baseline dosage of the medication, allowing for possible readiness and more spontaneity, and could have a synergistic effect with the on-demand dose.     He is interested in trying this and would like to start that.     Plan:  Add tadalafil 5 mg daily to on-demand regimen.  PSA June 2024.   FUV June 2024.   MRI beginning of December 2024.     Diagnoses and all orders for this visit:  Prostate cancer (CMS/HCC)  Elevated PSA  -     PSA; Future  -     MR prostate garett boundaries; Future  -     Creatinine, Serum; Future  Benign prostatic hyperplasia with lower urinary tract symptoms, symptom details unspecified  -     tadalafil (Cialis) 5 mg tablet; Take 1 tablet (5 mg) by mouth once daily.  Erectile dysfunction, unspecified erectile dysfunction type

## 2024-01-25 DIAGNOSIS — N40.1 BENIGN PROSTATIC HYPERPLASIA WITH LOWER URINARY TRACT SYMPTOMS, SYMPTOM DETAILS UNSPECIFIED: ICD-10-CM

## 2024-01-25 DIAGNOSIS — N52.9 ERECTILE DYSFUNCTION, UNSPECIFIED ERECTILE DYSFUNCTION TYPE: ICD-10-CM

## 2024-01-26 RX ORDER — TADALAFIL 20 MG/1
20 TABLET ORAL DAILY PRN
Qty: 10 TABLET | Refills: 3 | Status: SHIPPED | OUTPATIENT
Start: 2024-01-26 | End: 2024-06-10 | Stop reason: ALTCHOICE

## 2024-01-26 RX ORDER — TADALAFIL 5 MG/1
5 TABLET ORAL DAILY
Qty: 30 TABLET | Refills: 11 | Status: SHIPPED | OUTPATIENT
Start: 2024-01-26 | End: 2024-06-10 | Stop reason: SDUPTHER

## 2024-03-19 ENCOUNTER — OFFICE VISIT (OUTPATIENT)
Dept: PRIMARY CARE | Facility: CLINIC | Age: 74
End: 2024-03-19
Payer: MEDICARE

## 2024-03-19 ENCOUNTER — LAB (OUTPATIENT)
Dept: LAB | Facility: LAB | Age: 74
End: 2024-03-19
Payer: MEDICARE

## 2024-03-19 VITALS
SYSTOLIC BLOOD PRESSURE: 118 MMHG | OXYGEN SATURATION: 98 % | HEIGHT: 76 IN | WEIGHT: 218.8 LBS | BODY MASS INDEX: 26.65 KG/M2 | DIASTOLIC BLOOD PRESSURE: 70 MMHG | HEART RATE: 52 BPM | RESPIRATION RATE: 16 BRPM

## 2024-03-19 DIAGNOSIS — I10 BENIGN ESSENTIAL HYPERTENSION: ICD-10-CM

## 2024-03-19 DIAGNOSIS — E78.00 HYPERCHOLESTEROLEMIA: ICD-10-CM

## 2024-03-19 DIAGNOSIS — Z00.00 ROUTINE GENERAL MEDICAL EXAMINATION AT HEALTH CARE FACILITY: Primary | ICD-10-CM

## 2024-03-19 LAB
ALBUMIN SERPL BCP-MCNC: 4.4 G/DL (ref 3.4–5)
ALP SERPL-CCNC: 62 U/L (ref 33–136)
ALT SERPL W P-5'-P-CCNC: 17 U/L (ref 10–52)
ANION GAP SERPL CALC-SCNC: 12 MMOL/L (ref 10–20)
AST SERPL W P-5'-P-CCNC: 25 U/L (ref 9–39)
BASOPHILS # BLD AUTO: 0.02 X10*3/UL (ref 0–0.1)
BASOPHILS NFR BLD AUTO: 0.4 %
BILIRUB SERPL-MCNC: 0.6 MG/DL (ref 0–1.2)
BUN SERPL-MCNC: 17 MG/DL (ref 6–23)
CALCIUM SERPL-MCNC: 9.1 MG/DL (ref 8.6–10.3)
CHLORIDE SERPL-SCNC: 104 MMOL/L (ref 98–107)
CHOLEST SERPL-MCNC: 183 MG/DL (ref 0–199)
CHOLESTEROL/HDL RATIO: 4.2
CO2 SERPL-SCNC: 28 MMOL/L (ref 21–32)
CREAT SERPL-MCNC: 0.95 MG/DL (ref 0.5–1.3)
EGFRCR SERPLBLD CKD-EPI 2021: 84 ML/MIN/1.73M*2
EOSINOPHIL # BLD AUTO: 0.14 X10*3/UL (ref 0–0.4)
EOSINOPHIL NFR BLD AUTO: 2.9 %
ERYTHROCYTE [DISTWIDTH] IN BLOOD BY AUTOMATED COUNT: 12.4 % (ref 11.5–14.5)
GLUCOSE SERPL-MCNC: 121 MG/DL (ref 74–99)
HCT VFR BLD AUTO: 39.5 % (ref 41–52)
HDLC SERPL-MCNC: 43.8 MG/DL
HGB BLD-MCNC: 13.2 G/DL (ref 13.5–17.5)
IMM GRANULOCYTES # BLD AUTO: 0.01 X10*3/UL (ref 0–0.5)
IMM GRANULOCYTES NFR BLD AUTO: 0.2 % (ref 0–0.9)
LDLC SERPL CALC-MCNC: 105 MG/DL
LYMPHOCYTES # BLD AUTO: 1.15 X10*3/UL (ref 0.8–3)
LYMPHOCYTES NFR BLD AUTO: 23.7 %
MCH RBC QN AUTO: 33.3 PG (ref 26–34)
MCHC RBC AUTO-ENTMCNC: 33.4 G/DL (ref 32–36)
MCV RBC AUTO: 100 FL (ref 80–100)
MONOCYTES # BLD AUTO: 0.43 X10*3/UL (ref 0.05–0.8)
MONOCYTES NFR BLD AUTO: 8.8 %
NEUTROPHILS # BLD AUTO: 3.11 X10*3/UL (ref 1.6–5.5)
NEUTROPHILS NFR BLD AUTO: 64 %
NON HDL CHOLESTEROL: 139 MG/DL (ref 0–149)
NRBC BLD-RTO: 0 /100 WBCS (ref 0–0)
PLATELET # BLD AUTO: 245 X10*3/UL (ref 150–450)
POTASSIUM SERPL-SCNC: 4.5 MMOL/L (ref 3.5–5.3)
PROT SERPL-MCNC: 6.8 G/DL (ref 6.4–8.2)
RBC # BLD AUTO: 3.96 X10*6/UL (ref 4.5–5.9)
SODIUM SERPL-SCNC: 139 MMOL/L (ref 136–145)
TRIGL SERPL-MCNC: 171 MG/DL (ref 0–149)
TSH SERPL-ACNC: 1.88 MIU/L (ref 0.44–3.98)
VLDL: 34 MG/DL (ref 0–40)
WBC # BLD AUTO: 4.9 X10*3/UL (ref 4.4–11.3)

## 2024-03-19 PROCEDURE — 99214 OFFICE O/P EST MOD 30 MIN: CPT | Performed by: STUDENT IN AN ORGANIZED HEALTH CARE EDUCATION/TRAINING PROGRAM

## 2024-03-19 PROCEDURE — 80053 COMPREHEN METABOLIC PANEL: CPT

## 2024-03-19 PROCEDURE — 80061 LIPID PANEL: CPT

## 2024-03-19 PROCEDURE — 85025 COMPLETE CBC W/AUTO DIFF WBC: CPT

## 2024-03-19 PROCEDURE — 1158F ADVNC CARE PLAN TLK DOCD: CPT | Performed by: STUDENT IN AN ORGANIZED HEALTH CARE EDUCATION/TRAINING PROGRAM

## 2024-03-19 PROCEDURE — 1170F FXNL STATUS ASSESSED: CPT | Performed by: STUDENT IN AN ORGANIZED HEALTH CARE EDUCATION/TRAINING PROGRAM

## 2024-03-19 PROCEDURE — 1036F TOBACCO NON-USER: CPT | Performed by: STUDENT IN AN ORGANIZED HEALTH CARE EDUCATION/TRAINING PROGRAM

## 2024-03-19 PROCEDURE — 1123F ACP DISCUSS/DSCN MKR DOCD: CPT | Performed by: STUDENT IN AN ORGANIZED HEALTH CARE EDUCATION/TRAINING PROGRAM

## 2024-03-19 PROCEDURE — G0439 PPPS, SUBSEQ VISIT: HCPCS | Performed by: STUDENT IN AN ORGANIZED HEALTH CARE EDUCATION/TRAINING PROGRAM

## 2024-03-19 PROCEDURE — 3074F SYST BP LT 130 MM HG: CPT | Performed by: STUDENT IN AN ORGANIZED HEALTH CARE EDUCATION/TRAINING PROGRAM

## 2024-03-19 PROCEDURE — 1159F MED LIST DOCD IN RCRD: CPT | Performed by: STUDENT IN AN ORGANIZED HEALTH CARE EDUCATION/TRAINING PROGRAM

## 2024-03-19 PROCEDURE — 36415 COLL VENOUS BLD VENIPUNCTURE: CPT

## 2024-03-19 PROCEDURE — 84443 ASSAY THYROID STIM HORMONE: CPT

## 2024-03-19 PROCEDURE — 3078F DIAST BP <80 MM HG: CPT | Performed by: STUDENT IN AN ORGANIZED HEALTH CARE EDUCATION/TRAINING PROGRAM

## 2024-03-19 PROCEDURE — 1157F ADVNC CARE PLAN IN RCRD: CPT | Performed by: STUDENT IN AN ORGANIZED HEALTH CARE EDUCATION/TRAINING PROGRAM

## 2024-03-19 RX ORDER — LISINOPRIL 10 MG/1
10 TABLET ORAL DAILY
Qty: 90 TABLET | Refills: 1 | Status: SHIPPED | OUTPATIENT
Start: 2024-03-19

## 2024-03-19 ASSESSMENT — ACTIVITIES OF DAILY LIVING (ADL)
BATHING: INDEPENDENT
MANAGING_FINANCES: INDEPENDENT
GROCERY_SHOPPING: INDEPENDENT
DOING_HOUSEWORK: INDEPENDENT
TAKING_MEDICATION: INDEPENDENT
DRESSING: INDEPENDENT

## 2024-03-19 ASSESSMENT — PATIENT HEALTH QUESTIONNAIRE - PHQ9
SUM OF ALL RESPONSES TO PHQ9 QUESTIONS 1 AND 2: 0
2. FEELING DOWN, DEPRESSED OR HOPELESS: NOT AT ALL
1. LITTLE INTEREST OR PLEASURE IN DOING THINGS: NOT AT ALL

## 2024-03-19 ASSESSMENT — ENCOUNTER SYMPTOMS
ANAL BLEEDING: 0
PALPITATIONS: 0
FATIGUE: 0
WHEEZING: 0
HEADACHES: 0
BACK PAIN: 0
ARTHRALGIAS: 0
SINUS PAIN: 0
RHINORRHEA: 0
LIGHT-HEADEDNESS: 0
SHORTNESS OF BREATH: 0
ABDOMINAL PAIN: 0
FEVER: 0

## 2024-03-19 NOTE — PROGRESS NOTES
"Subjective   Reason for Visit: Faustino Isaacs Jr. is an 74 y.o. male here for a Medicare Wellness visit.          Reviewed all medications by prescribing practitioner or clinical pharmacist (such as prescriptions, OTCs, herbal therapies and supplements) and documented in the medical record.      Patient Care Team:  Susy Mason DO as PCP - General (Family Medicine)  Susy Mason DO as PCP - [employer] PCP     Review of Systems   Constitutional:  Negative for fatigue and fever.   HENT:  Negative for rhinorrhea and sinus pain.    Respiratory:  Negative for shortness of breath and wheezing.    Cardiovascular:  Negative for chest pain and palpitations.   Gastrointestinal:  Negative for abdominal pain and anal bleeding.   Musculoskeletal:  Negative for arthralgias and back pain.   Neurological:  Negative for syncope, light-headedness and headaches.       Objective   Vitals:  /70   Pulse 52   Resp 16   Ht 1.93 m (6' 4\")   Wt 99.2 kg (218 lb 12.8 oz)   SpO2 98%   BMI 26.63 kg/m²       Physical Exam  Vitals reviewed.   Constitutional:       General: He is not in acute distress.     Appearance: He is not ill-appearing.   HENT:      Right Ear: Tympanic membrane and ear canal normal.      Left Ear: Tympanic membrane and ear canal normal.      Mouth/Throat:      Mouth: Mucous membranes are moist.      Pharynx: Oropharynx is clear. No oropharyngeal exudate or posterior oropharyngeal erythema.   Eyes:      Extraocular Movements: Extraocular movements intact.      Conjunctiva/sclera: Conjunctivae normal.      Pupils: Pupils are equal, round, and reactive to light.   Neck:      Vascular: No carotid bruit.   Cardiovascular:      Rate and Rhythm: Normal rate and regular rhythm.      Heart sounds: No murmur heard.     No gallop.   Pulmonary:      Effort: Pulmonary effort is normal.      Breath sounds: Normal breath sounds. No wheezing, rhonchi or rales.   Abdominal:      General: Abdomen is flat. Bowel " sounds are normal.      Palpations: Abdomen is soft.      Tenderness: There is no abdominal tenderness.   Musculoskeletal:      Cervical back: Neck supple.      Left lower leg: No edema.   Skin:     General: Skin is warm and dry.      Findings: No rash.   Neurological:      General: No focal deficit present.      Mental Status: He is alert and oriented to person, place, and time.      Gait: Gait normal.   Psychiatric:         Mood and Affect: Mood normal.         Behavior: Behavior normal.       Assessment/Plan   Problem List Items Addressed This Visit       Benign essential hypertension     Mr. Isaacs is a 75 yo male here today for f/u for HTN and medicare wellness.      HTN- well controlled  BP controlled  118/70mmHg   refilled lisinopril 10mg daily  -watch salt in diet  -work on weight loss  -work to get regular exercise  Labs: cbc, cmp      Prostate Ca  Bx postiive for adenocarcinoma of the prostate   following with urology   Currently opting for active surveillance   PSA 1.3 Q6 month testing     Health Maintenance   Labs: cbc, cmp     rtc in 6 months for HTN follow up   Health Maintenance   Topic Date Due    Medicare Annual Wellness Visit (AWV)  Never done    Hepatitis C Screening  Never done    DTaP/Tdap/Td Vaccines (2 - Td or Tdap) 04/26/2023    COVID-19 Vaccine (6 - 2023-24 season) 12/07/2023    Lipid Panel  03/16/2028    Colorectal Cancer Screening  05/25/2031    Influenza Vaccine  Completed    Pneumococcal Vaccine: 65+ Years  Completed    Zoster Vaccines  Completed    HIB Vaccines  Aged Out    Hepatitis B Vaccines  Aged Out    IPV Vaccines  Aged Out    Hepatitis A Vaccines  Aged Out    Meningococcal Vaccine  Aged Out    Rotavirus Vaccines  Aged Out    HPV Vaccines  Aged Out    Irritable Bowel Syndrome  Discontinued

## 2024-06-04 ENCOUNTER — LAB (OUTPATIENT)
Dept: LAB | Facility: LAB | Age: 74
End: 2024-06-04
Payer: MEDICARE

## 2024-06-04 DIAGNOSIS — R97.20 ELEVATED PSA: ICD-10-CM

## 2024-06-04 LAB — PSA SERPL-MCNC: 1.83 NG/ML

## 2024-06-04 PROCEDURE — 84153 ASSAY OF PSA TOTAL: CPT

## 2024-06-04 PROCEDURE — 36415 COLL VENOUS BLD VENIPUNCTURE: CPT

## 2024-06-10 ENCOUNTER — OFFICE VISIT (OUTPATIENT)
Dept: UROLOGY | Facility: CLINIC | Age: 74
End: 2024-06-10
Payer: MEDICARE

## 2024-06-10 DIAGNOSIS — N52.9 ERECTILE DYSFUNCTION, UNSPECIFIED ERECTILE DYSFUNCTION TYPE: Primary | ICD-10-CM

## 2024-06-10 DIAGNOSIS — R97.20 ELEVATED PSA: ICD-10-CM

## 2024-06-10 DIAGNOSIS — C61 PROSTATE CANCER (MULTI): ICD-10-CM

## 2024-06-10 DIAGNOSIS — N40.1 BENIGN PROSTATIC HYPERPLASIA WITH LOWER URINARY TRACT SYMPTOMS, SYMPTOM DETAILS UNSPECIFIED: ICD-10-CM

## 2024-06-10 PROCEDURE — 1123F ACP DISCUSS/DSCN MKR DOCD: CPT | Performed by: STUDENT IN AN ORGANIZED HEALTH CARE EDUCATION/TRAINING PROGRAM

## 2024-06-10 PROCEDURE — 1157F ADVNC CARE PLAN IN RCRD: CPT | Performed by: STUDENT IN AN ORGANIZED HEALTH CARE EDUCATION/TRAINING PROGRAM

## 2024-06-10 PROCEDURE — 99214 OFFICE O/P EST MOD 30 MIN: CPT | Performed by: STUDENT IN AN ORGANIZED HEALTH CARE EDUCATION/TRAINING PROGRAM

## 2024-06-10 PROCEDURE — G2211 COMPLEX E/M VISIT ADD ON: HCPCS | Performed by: STUDENT IN AN ORGANIZED HEALTH CARE EDUCATION/TRAINING PROGRAM

## 2024-06-10 RX ORDER — TADALAFIL 5 MG/1
5 TABLET ORAL DAILY
Qty: 30 TABLET | Refills: 11 | Status: SHIPPED | OUTPATIENT
Start: 2024-06-10 | End: 2025-06-10

## 2024-06-10 NOTE — PROGRESS NOTES
Subjective   Patient ID: Faustino Isaacs Jr. is a 74 y.o. male.    HPI  74 y.o. 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 He opted to wait on MRI pending PSA results.     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 He is comfortable with this.     He also he also reports that he has been using tadalafil 20 mg, added daily 5 mg Cialis to regimen on last visit.      MRI prostate 11/27/23:   IMPRESSION:  1.  Similar appearance of a PI-RADS 4 lesion of the left  posterolateral peripheral zone at mid gland.  2. A PI-RADS 3 lesion in the medial left peripheral zone  posteromedial peripheral zone of the base of the prostate, which  appear slightly more prominent compared to 2022 MRI.  3. 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      PI-RADS 4 - High (clinically significant cancer is likely to be  present).    Lab Results   Component Value Date    PSA 1.83 06/04/2024    PSA 2.18 12/01/2023    PSA 1.30 05/30/2023    PSA 3.4 10/04/2022    PSA 1.13 10/05/2021    PSA 0.91 06/27/2018       Review of Systems    Objective   Physical Exam    Assessment/Plan   74 y.o. 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 He opted to wait on MRI pending  PSA results.     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 He is comfortable with this.     He also he also reports that he has been using tadalafil 20mg, added daily 5 mg Cialis to regimen on last visit.     MRI results from November 2023 showed similar appearance of a PI-RADS 4 lesion of the left posterolateral peripheral zone at mid gland. A PI-RADS 3 lesion in the medial left peripheral zone posteromedial peripheral zone of the base of the prostate, which appears slightly more prominent compared to 2022 MRI.    I recommended repeating MRI in November 2024 despite the PSA results. Will be due for PSA at the time as  well. He agrees with plan.     Plan:  Continue Cialis 5 mg daily.   Stop Cialis 20 mg PRN prior to intercourse. '  PSA, MRI end of November 2024.   FUV December 2024.     Diagnoses and all orders for this visit:  Erectile dysfunction, unspecified erectile dysfunction type  Benign prostatic hyperplasia with lower urinary tract symptoms, symptom details unspecified  -     tadalafil (Cialis) 5 mg tablet; Take 1 tablet (5 mg) by mouth once daily.  -     Creatinine; Future  Elevated PSA  Prostate cancer (Multi)  -     Prostate Specific Antigen; Future  -     MR prostate with garett boundaries; Future    Scribe Attestation  By signing my name below, IKarmen Scribe attest that this documentation has been prepared under the direction and in the presence of Kalin Flores MD.

## 2024-09-05 ENCOUNTER — TELEPHONE (OUTPATIENT)
Dept: PRIMARY CARE | Facility: CLINIC | Age: 74
End: 2024-09-05
Payer: MEDICARE

## 2024-09-19 ENCOUNTER — APPOINTMENT (OUTPATIENT)
Dept: PRIMARY CARE | Facility: CLINIC | Age: 74
End: 2024-09-19
Payer: MEDICARE

## 2024-09-19 ENCOUNTER — LAB (OUTPATIENT)
Dept: LAB | Facility: LAB | Age: 74
End: 2024-09-19
Payer: MEDICARE

## 2024-09-19 VITALS
WEIGHT: 208.2 LBS | SYSTOLIC BLOOD PRESSURE: 134 MMHG | HEART RATE: 53 BPM | OXYGEN SATURATION: 99 % | DIASTOLIC BLOOD PRESSURE: 72 MMHG | BODY MASS INDEX: 25.35 KG/M2 | HEIGHT: 76 IN

## 2024-09-19 DIAGNOSIS — E78.00 HYPERCHOLESTEROLEMIA: ICD-10-CM

## 2024-09-19 DIAGNOSIS — Z23 IMMUNIZATION DUE: ICD-10-CM

## 2024-09-19 DIAGNOSIS — I10 BENIGN ESSENTIAL HYPERTENSION: ICD-10-CM

## 2024-09-19 DIAGNOSIS — M54.32 SCIATICA, LEFT SIDE: ICD-10-CM

## 2024-09-19 DIAGNOSIS — I10 BENIGN ESSENTIAL HYPERTENSION: Primary | ICD-10-CM

## 2024-09-19 LAB
ALBUMIN SERPL BCP-MCNC: 4.5 G/DL (ref 3.4–5)
ALP SERPL-CCNC: 75 U/L (ref 33–136)
ALT SERPL W P-5'-P-CCNC: 21 U/L (ref 10–52)
ANION GAP SERPL CALC-SCNC: 13 MMOL/L (ref 10–20)
AST SERPL W P-5'-P-CCNC: 23 U/L (ref 9–39)
BASOPHILS # BLD AUTO: 0.01 X10*3/UL (ref 0–0.1)
BASOPHILS NFR BLD AUTO: 0.2 %
BILIRUB SERPL-MCNC: 0.6 MG/DL (ref 0–1.2)
BUN SERPL-MCNC: 20 MG/DL (ref 6–23)
CALCIUM SERPL-MCNC: 9.2 MG/DL (ref 8.6–10.3)
CHLORIDE SERPL-SCNC: 103 MMOL/L (ref 98–107)
CO2 SERPL-SCNC: 26 MMOL/L (ref 21–32)
CREAT SERPL-MCNC: 0.83 MG/DL (ref 0.5–1.3)
CREAT UR-MCNC: 30.4 MG/DL (ref 20–370)
EGFRCR SERPLBLD CKD-EPI 2021: >90 ML/MIN/1.73M*2
EOSINOPHIL # BLD AUTO: 0.07 X10*3/UL (ref 0–0.4)
EOSINOPHIL NFR BLD AUTO: 1.6 %
ERYTHROCYTE [DISTWIDTH] IN BLOOD BY AUTOMATED COUNT: 12.4 % (ref 11.5–14.5)
GLUCOSE SERPL-MCNC: 105 MG/DL (ref 74–99)
HCT VFR BLD AUTO: 39.1 % (ref 41–52)
HGB BLD-MCNC: 13 G/DL (ref 13.5–17.5)
IMM GRANULOCYTES # BLD AUTO: 0.01 X10*3/UL (ref 0–0.5)
IMM GRANULOCYTES NFR BLD AUTO: 0.2 % (ref 0–0.9)
LYMPHOCYTES # BLD AUTO: 0.95 X10*3/UL (ref 0.8–3)
LYMPHOCYTES NFR BLD AUTO: 21.2 %
MCH RBC QN AUTO: 33.2 PG (ref 26–34)
MCHC RBC AUTO-ENTMCNC: 33.2 G/DL (ref 32–36)
MCV RBC AUTO: 100 FL (ref 80–100)
MICROALBUMIN UR-MCNC: <7 MG/L
MICROALBUMIN/CREAT UR: NORMAL MG/G{CREAT}
MONOCYTES # BLD AUTO: 0.41 X10*3/UL (ref 0.05–0.8)
MONOCYTES NFR BLD AUTO: 9.1 %
NEUTROPHILS # BLD AUTO: 3.04 X10*3/UL (ref 1.6–5.5)
NEUTROPHILS NFR BLD AUTO: 67.7 %
NRBC BLD-RTO: 0 /100 WBCS (ref 0–0)
PLATELET # BLD AUTO: 250 X10*3/UL (ref 150–450)
POTASSIUM SERPL-SCNC: 4.5 MMOL/L (ref 3.5–5.3)
PROT SERPL-MCNC: 6.9 G/DL (ref 6.4–8.2)
RBC # BLD AUTO: 3.92 X10*6/UL (ref 4.5–5.9)
SODIUM SERPL-SCNC: 137 MMOL/L (ref 136–145)
WBC # BLD AUTO: 4.5 X10*3/UL (ref 4.4–11.3)

## 2024-09-19 PROCEDURE — 90662 IIV NO PRSV INCREASED AG IM: CPT | Performed by: STUDENT IN AN ORGANIZED HEALTH CARE EDUCATION/TRAINING PROGRAM

## 2024-09-19 PROCEDURE — 82570 ASSAY OF URINE CREATININE: CPT

## 2024-09-19 PROCEDURE — 1157F ADVNC CARE PLAN IN RCRD: CPT | Performed by: STUDENT IN AN ORGANIZED HEALTH CARE EDUCATION/TRAINING PROGRAM

## 2024-09-19 PROCEDURE — 85025 COMPLETE CBC W/AUTO DIFF WBC: CPT

## 2024-09-19 PROCEDURE — 80053 COMPREHEN METABOLIC PANEL: CPT

## 2024-09-19 PROCEDURE — G2211 COMPLEX E/M VISIT ADD ON: HCPCS | Performed by: STUDENT IN AN ORGANIZED HEALTH CARE EDUCATION/TRAINING PROGRAM

## 2024-09-19 PROCEDURE — 36415 COLL VENOUS BLD VENIPUNCTURE: CPT

## 2024-09-19 PROCEDURE — 1123F ACP DISCUSS/DSCN MKR DOCD: CPT | Performed by: STUDENT IN AN ORGANIZED HEALTH CARE EDUCATION/TRAINING PROGRAM

## 2024-09-19 PROCEDURE — G0008 ADMIN INFLUENZA VIRUS VAC: HCPCS | Performed by: STUDENT IN AN ORGANIZED HEALTH CARE EDUCATION/TRAINING PROGRAM

## 2024-09-19 PROCEDURE — 3078F DIAST BP <80 MM HG: CPT | Performed by: STUDENT IN AN ORGANIZED HEALTH CARE EDUCATION/TRAINING PROGRAM

## 2024-09-19 PROCEDURE — 3008F BODY MASS INDEX DOCD: CPT | Performed by: STUDENT IN AN ORGANIZED HEALTH CARE EDUCATION/TRAINING PROGRAM

## 2024-09-19 PROCEDURE — 1159F MED LIST DOCD IN RCRD: CPT | Performed by: STUDENT IN AN ORGANIZED HEALTH CARE EDUCATION/TRAINING PROGRAM

## 2024-09-19 PROCEDURE — 99214 OFFICE O/P EST MOD 30 MIN: CPT | Performed by: STUDENT IN AN ORGANIZED HEALTH CARE EDUCATION/TRAINING PROGRAM

## 2024-09-19 PROCEDURE — 82043 UR ALBUMIN QUANTITATIVE: CPT

## 2024-09-19 PROCEDURE — 3075F SYST BP GE 130 - 139MM HG: CPT | Performed by: STUDENT IN AN ORGANIZED HEALTH CARE EDUCATION/TRAINING PROGRAM

## 2024-09-19 PROCEDURE — 1036F TOBACCO NON-USER: CPT | Performed by: STUDENT IN AN ORGANIZED HEALTH CARE EDUCATION/TRAINING PROGRAM

## 2024-09-19 NOTE — PROGRESS NOTES
Subjective   Patient ID: Faustino Isaacs Jr. is a 74 y.o. male who presents for Follow-up (Pt is here for a 6 month follow up.).  HPI  COVID-19 infection 2 weeks ago after going to a wedding   Has noticed some decreased fatigue     Objective   Physical Exam  Vitals reviewed.   Constitutional:       Appearance: Normal appearance.   Cardiovascular:      Rate and Rhythm: Normal rate and regular rhythm.      Heart sounds: No murmur heard.  Pulmonary:      Effort: Pulmonary effort is normal. No respiratory distress.      Breath sounds: Normal breath sounds. No wheezing.   Musculoskeletal:      Cervical back: Neck supple.      Left lower leg: No edema.   Neurological:      Mental Status: He is alert.         Current Outpatient Medications:   •  lisinopril 10 mg tablet, Take 1 tablet (10 mg) by mouth once daily., Disp: 90 tablet, Rfl: 1  •  tadalafil (Cialis) 5 mg tablet, Take 1 tablet (5 mg) by mouth once daily., Disp: 30 tablet, Rfl: 11     Assessment/Plan   Diagnoses and all orders for this visit:  Benign essential hypertension  -     CBC and Auto Differential; Future  -     Comprehensive metabolic panel; Future  -     Albumin-Creatinine Ratio, Urine Random; Future  Hypercholesterolemia  Immunization due  -     Flu vaccine, trivalent, preservative free, HIGH-DOSE, age 65y+ (Fluzone)    Mr. Isaacs is a 73 yo male here today for f/u for HTN.     HTN- well controlled  BP controlled  134/72 mmHg   refilled lisinopril 10mg daily  -watch salt in diet  -work on weight loss  -work to get regular exercise  Labs: cbc, cmp      Prostate Ca  Bx postiive for adenocarcinoma of the prostate   following with urology   Currently opting for active surveillance   PSA 1.3 Q6 month testing  PSA, MRI end of November 2024.   FUV December 2024.     Left lower piriformis pain   Stabbing pain, waking up at night   Physical therapy ordered      Health Maintenance   Labs: cbc, cmp, albumin      rtc in 6 months for HTN follow up and [facility]    Health  Maintenance   Topic Date Due   • Hepatitis C Screening  Never done   • RSV Pregnant patients and/or  patients aged 60+ years (1 - 1-dose 60+ series) Never done   • DTaP/Tdap/Td Vaccines (2 - Td or Tdap) 04/26/2023   • Influenza Vaccine (1) 09/01/2024   • COVID-19 Vaccine (5 - 2023-24 season) 09/01/2024   • Medicare Annual Wellness Visit (AWV)  03/20/2025   • Lipid Panel  03/19/2029   • Colorectal Cancer Screening  05/25/2031   • Pneumococcal Vaccine: 65+ Years  Completed   • Zoster Vaccines  Completed   • HIB Vaccines  Aged Out   • Hepatitis B Vaccines  Aged Out   • IPV Vaccines  Aged Out   • Hepatitis A Vaccines  Aged Out   • Meningococcal Vaccine  Aged Out   • Rotavirus Vaccines  Aged Out   • HPV Vaccines  Aged Out   • Irritable Bowel Syndrome  Discontinued            Susy Mason DO 09/19/24 8:32 AM

## 2024-10-08 ENCOUNTER — APPOINTMENT (OUTPATIENT)
Dept: PHYSICAL THERAPY | Facility: CLINIC | Age: 74
End: 2024-10-08
Payer: MEDICARE

## 2024-10-21 ENCOUNTER — EVALUATION (OUTPATIENT)
Dept: PHYSICAL THERAPY | Facility: CLINIC | Age: 74
End: 2024-10-21
Payer: MEDICARE

## 2024-10-21 DIAGNOSIS — M54.32 SCIATICA, LEFT SIDE: ICD-10-CM

## 2024-10-21 DIAGNOSIS — M54.9 DORSALGIA: Primary | ICD-10-CM

## 2024-10-21 PROCEDURE — 97161 PT EVAL LOW COMPLEX 20 MIN: CPT | Mod: GP | Performed by: PHYSICAL THERAPIST

## 2024-10-21 ASSESSMENT — ENCOUNTER SYMPTOMS
OCCASIONAL FEELINGS OF UNSTEADINESS: 0
DEPRESSION: 0
LOSS OF SENSATION IN FEET: 0

## 2024-10-21 ASSESSMENT — PAIN - FUNCTIONAL ASSESSMENT: PAIN_FUNCTIONAL_ASSESSMENT: 0-10

## 2024-10-21 ASSESSMENT — PAIN SCALES - GENERAL: PAINLEVEL_OUTOF10: 5 - MODERATE PAIN

## 2024-10-21 NOTE — PROGRESS NOTES
Physical Therapy  Physical Therapy Evaluation      Patient Name: Faustino Isaacs Jr.  MRN: [id number]  Today's Date: 10/21/2024  Time Calculation  Start Time: 1432  Stop Time: 1512  Time Calculation (min): 40 min  General  Reason for Referral: Lumbar radiculopathy  Referred By: charlotte  Past Medical History Relevant to Rehab: Sciatica Right, HBP  General Comment: Onset date: August 2024, script dated 9/19/2024; Auth required: Visit number 1: Patient reports sharp pain in the left lumbar and sacral region. He does remeber helping to build a shed in August and after that he started having pain. He notes the pain now is less constant but sharp pain occurs with rotating in bed or chair, prolong walking, some leaning or trunk flexion can also cause some pain.    TREATING DIAGNOSIS:  1. Dorsalgia  Follow Up In Physical Therapy      2. Sciatica, left side  Referral to Physical Therapy          ASSESSMENT: Patient is a 75 yo male with acute back pain resulting in limited participation in pain-free ADLs and inability to perform at their prior level of function specifically,  bed mobility and prolonged standing or walking. Pt would benefit from skilled Physical Therapy services to address the impairments of:    in order to return to safe and pain-free ADL's and prior level of function.    PLAN:      Planned Interventions include: therapeutic exercise, therapeutic activity, self-care home management, manual therapy, therapeutic activities, gait training, neuromuscular coordination, vasopneumatic, dry needling, electric stimulation, ultrasound, kinesiotaping, wound care    Goals:  Active       PT Problem       Patient will increase tolerance to activity in standing for any duration in order to participate in ADL, IADL, work, and conor skills or activities.       Start:  10/21/24    Expected End:  03/21/25            Patient will increase core stabilization strength to 4+/5 in order to participate in ADL, IADL, work, or leisure  "skills and activities.       Start:  10/21/24    Expected End:  03/21/25            Patient will increase right side bend or rotation AROM to WNL without pain in order to participate in ADL, IADL, work, or leisure skills and activities, especially with bed mobility.       Start:  10/21/24    Expected End:  03/21/25            Patient will improve walking tolerance to any duration with normalized gait mechanics in order to participate in ADL, IADL, work, or leisure skills and activities.       Start:  10/21/24    Expected End:  03/21/25            Patient will improve the outcome measure score of the AHMET to <3% for increased independence and ease with performance of ADL, IADL, work, or leisure activities.       Start:  10/21/24    Expected End:  03/21/25            Patient Stated Goal: \"I just want this to go away so I can walk and sleep through the night.\"       Start:  10/21/24    Expected End:  03/21/25              Plan of care was developed with input and agreement by the patient.  Potential to achieve goals:  Good    Subjective:    Pt goals for therapy: \"I just want this to go away so I can walk and sleep through the night.\"  Pain Assessment: 0-10  0-10 (Numeric) Pain Score: 5 - Moderate pain (at worst 8/10 with rotation)  Pain Type: Acute pain  Pain Location: Back  Pain Orientation: Left  Aggravating Factors: Other walking, rolling/turning in bed, standing for prolonged periods  Relieving Factors: Rest, Heat, and Stretching     Precautions:  Precautions  STEADI Fall Risk Score (The score of 4 or more indicates an increased risk of falling): 1  Precautions Comment: None    Medical History Form: Reviewed (scanned into chart)    Current Condition since injury:   better      Relevant Information (PMH & Previous Tests/Imaging):     Previous Interventions/Treatments: None   Prior Level of Function (PLOF)Prior Function Per Pt/Caregiver Report  Level of [place]: Independent with ADLs and functional transfers " (with or without pain)  Receives Help From: Other (Comment) (spouse)  Exercise/Physical Activity: Patient goes to gym for cardio and light weight training 3-5 days a week  Work/School: retired  Current ADL/IADL Status: independent with and without pain     Patients Living Environment: Reviewed and no concern Home Living  Home Living Comment: [place] with 2 steps into or out of home  EMILIA: Insidious overuse with helping to build a shed    Primary Language: English    Social Determinants of health:  (-) Lack of Money                                      (-) poor physical home environment     (-) no job/ poor work conditions    (-) un-education/illiteracy   (-) traumatic childhood experiences            (-) lacks social supports/coping skills    (-) lacks healthy behaviors                     (-) lacks access to healthcare                   Red Flags:   REVIEW OF SYSTEMS/ RED FLAGS  (-) osteoporosis  (-) Fever/chills, SOB, loss of taste/smell  (-) Cough/ Sneeze   (-) balance difficulties/FALLS   (-) numbness/tingle  (-) weakness  (-) unremitting night pain   Sleep:  position:  (-) AM Stiffness:            (-) Unexplained Wt. Loss  (-) h/o CA   (-) Pacemaker  (-) Bowel/Bladder:            (-) saddle paresthesia    Objective:      -AROM: WFL with pain on left distal lumbar region with right side bend and rotation with reports of 8-9/10 pain for short duration      -MMT: Core stabilizers: 4/5, bilateral hips and LE 5/5      -SLR negative, Slump right, and ALLAN negative      -Slight positive Slump left and Faustino bilaterally tight      -Gait is antalgic for knees, but some pain upon initiation of gait activity on the left side      -Normal myotomes and dermatomes noted bilaterally      -Tension noted left thoracolumbar paraspinals and tenderness with trigger points noted distal left lumbar paraspinals and some tension and tenderness left obliques and multifidus.          Precautions:   Precautions  STEADI Fall Risk Score (The  score of 4 or more indicates an increased risk of falling): 1  Precautions Comment: None    Medical History Form: Reviewed (scanned into chart)    EDUCATION: home exercise program, plan of care, activity modifications, pain management, and injury pathology   Access Code: [email or web address]  URL: [email or web address]  Date: 10/21/2024  Prepared by: FELI Mazariegosord    Exercises  - Supine Single Knee to Chest Stretch (Mirrored)  - 2 x daily - 7 x weekly - 1 sets - 3-4 reps - 20-25 seconds hold  - Supine Piriformis Stretch with Foot on Ground (Mirrored)  - 2 x daily - 7 x weekly - 1 sets - 3-4 reps - 20-25 seconds hold  - Supine Lower Trunk Rotation (Mirrored)  - 2 x daily - 7 x weekly - 1 sets - 3-4 reps - 209-25 seconds hold    Treatments:   This therapist instructed and demonstrated interventions to patient, patient completed the following under direct supervision of this therapist:  PT Evaluation Time Entry  PT Evaluation (Low) Time Entry: 40    Chandler Boss, PT

## 2024-10-31 ENCOUNTER — TREATMENT (OUTPATIENT)
Dept: PHYSICAL THERAPY | Facility: CLINIC | Age: 74
End: 2024-10-31
Payer: MEDICARE

## 2024-10-31 DIAGNOSIS — M54.9 DORSALGIA: Primary | ICD-10-CM

## 2024-10-31 PROCEDURE — 97110 THERAPEUTIC EXERCISES: CPT | Mod: GP | Performed by: PHYSICAL THERAPIST

## 2024-10-31 ASSESSMENT — PAIN - FUNCTIONAL ASSESSMENT: PAIN_FUNCTIONAL_ASSESSMENT: 0-10

## 2024-10-31 ASSESSMENT — PAIN SCALES - GENERAL: PAINLEVEL_OUTOF10: 0 - NO PAIN

## 2024-11-06 ENCOUNTER — TREATMENT (OUTPATIENT)
Dept: PHYSICAL THERAPY | Facility: CLINIC | Age: 74
End: 2024-11-06
Payer: MEDICARE

## 2024-11-06 DIAGNOSIS — M54.9 DORSALGIA: ICD-10-CM

## 2024-11-06 PROCEDURE — 97140 MANUAL THERAPY 1/> REGIONS: CPT | Mod: GP | Performed by: PHYSICAL THERAPIST

## 2024-11-06 PROCEDURE — 97110 THERAPEUTIC EXERCISES: CPT | Mod: GP | Performed by: PHYSICAL THERAPIST

## 2024-11-06 ASSESSMENT — PAIN - FUNCTIONAL ASSESSMENT: PAIN_FUNCTIONAL_ASSESSMENT: 0-10

## 2024-11-06 ASSESSMENT — PAIN SCALES - GENERAL: PAINLEVEL_OUTOF10: 0 - NO PAIN

## 2024-11-06 NOTE — PROGRESS NOTES
"    Physical Therapy  Physical Therapy Treatment    Patient Name: Faustino Isaacs Jr.  MRN: [id number]  Today's Date: 11/6/2024  Time Calculation  Start Time: 1100  Stop Time: 1140  Time Calculation (min): 40 min    General  Reason for Referral: Lumbar radiculopathy  Referred By: charlotte  Past Medical History Relevant to Rehab: Sciatica Right, HBP  General Comment: Onset date: August 2024, script dated 9/19/2024; Auth required: Visit number 3: Patient reduced pain from the HEP and good compliance to his HEP and gym routine. \"I still get it at night when I go to roll over.\"  Assessment:    Manual therapy added in this date the pain site and increase in strengthening and resistive exercises in functional movement patterns with little residual pain.  Plan:  Reassess next      Active       PT Problem       Patient will increase tolerance to activity in standing for any duration in order to participate in ADL, IADL, work, and conor skills or activities.       Start:  10/21/24    Expected End:  03/21/25            Patient will increase core stabilization strength to 4+/5 in order to participate in ADL, IADL, work, or leisure skills and activities.       Start:  10/21/24    Expected End:  03/21/25            Patient will increase right side bend or rotation AROM to WNL without pain in order to participate in ADL, IADL, work, or leisure skills and activities, especially with bed mobility.       Start:  10/21/24    Expected End:  03/21/25            Patient will improve walking tolerance to any duration with normalized gait mechanics in order to participate in ADL, IADL, work, or leisure skills and activities.       Start:  10/21/24    Expected End:  03/21/25            Patient will improve the outcome measure score of the AHMET to <3% for increased independence and ease with performance of ADL, IADL, work, or leisure activities.       Start:  10/21/24    Expected End:  03/21/25            Patient Stated Goal: \"I just want this to " "go away so I can walk and sleep through the night.\"       Start:  10/21/24    Expected End:  03/21/25                General  Chief Complaint:   1. Dorsalgia  Follow Up In Physical Therapy          Subjective:     Current Problem  General  Reason for Referral: Lumbar radiculopathy  Referred By: charlotte  Past Medical History Relevant to Rehab: Sciatica Right, HBP  General Comment: Onset date: August 2024, script dated 9/19/2024; Auth required: Visit number 3: Patient reduced pain from the HEP and good compliance to his HEP and gym routine. \"I still get it at night when I go to roll over.\"    Precautions  Precautions Comment: none    Patient perform today's treatment with some mild muscle soreness.       Pain  Pain Assessment: 0-10  0-10 (Numeric) Pain Score: 0 - No pain    Objective:     Treatments:   This therapist instructed and demonstrated interventions to patient, patient completed the following under direct supervision of this therapist:    30 minutes  Therapeutic Exercise  Therapeutic Exercise Performed: Yes  Therapeutic Exercise Activity 1: Scifit: seat 15, level 1, 5'  Therapeutic Exercise Activity 2: supine upper trunk rotation stretch: 3 x 20\"  Therapeutic Exercise Activity 3: Standing double yellow tubing for all: side bend left: 2 x 15  Therapeutic Exercise Activity 4: PArloff press: 20 x R/L  Therapeutic Exercise Activity 5: horizontal abduction with rotation: 20 x 2  Therapeutic Exercise Activity 6: horizontal abduction with rotation: 2 x 20     10 minutes  Manual Therapy  Manual Therapy Performed: Yes  Manual Therapy Activity 1: Patient in right side lying: DTM to the distal right multifidus and obliques with trigger point release           Education:      Chandler Boss, PT  "

## 2024-11-13 ENCOUNTER — TREATMENT (OUTPATIENT)
Dept: PHYSICAL THERAPY | Facility: CLINIC | Age: 74
End: 2024-11-13
Payer: MEDICARE

## 2024-11-13 DIAGNOSIS — M54.9 DORSALGIA: ICD-10-CM

## 2024-11-13 PROCEDURE — 97530 THERAPEUTIC ACTIVITIES: CPT | Mod: GP | Performed by: PHYSICAL THERAPIST

## 2024-11-13 ASSESSMENT — PAIN - FUNCTIONAL ASSESSMENT: PAIN_FUNCTIONAL_ASSESSMENT: 0-10

## 2024-11-13 ASSESSMENT — PAIN SCALES - GENERAL: PAINLEVEL_OUTOF10: 0 - NO PAIN

## 2024-11-13 NOTE — PROGRESS NOTES
"    Physical Therapy  Physical Therapy Treatment    Patient Name: Faustino Isaacs Jr.  MRN: [id number]  Today's Date: 11/13/2024  Time Calculation  Start Time: 1012  Stop Time: 1030  Time Calculation (min): 18 min    General  Reason for Referral: Lumbar radiculopathy  Referred By: charlotte  Past Medical History Relevant to Rehab: Sciatica Right, HBP  General Comment: Onset date: August 2024, script dated 9/19/2024; Auth required: Visit number 4: Patient has done yard work and workouts with addition of HEP to normal gym activity. No issues with sleeping or activity at this time. \"If if get it it is only there, it doesn't stop me from doing anything. \"  Assessment:    Patient has achieved all long term goals, is compliant with HEP, and reports little to no issues with his left flank at this time.   Plan:  Discharge PT services at this time.      Active       PT Problem       Patient will increase tolerance to activity in standing for any duration in order to participate in ADL, IADL, work, and conor skills or activities. (Met)       Start:  10/21/24    Expected End:  03/21/25    Resolved:  11/13/24         Patient will increase core stabilization strength to 4+/5 in order to participate in ADL, IADL, work, or leisure skills and activities.       Start:  10/21/24    Expected End:  03/21/25            Patient will increase right side bend or rotation AROM to WNL without pain in order to participate in ADL, IADL, work, or leisure skills and activities, especially with bed mobility. (Met)       Start:  10/21/24    Expected End:  03/21/25    Resolved:  11/13/24         Patient will improve walking tolerance to any duration with normalized gait mechanics in order to participate in ADL, IADL, work, or leisure skills and activities. (Met)       Start:  10/21/24    Expected End:  03/21/25    Resolved:  11/13/24         Patient will improve the outcome measure score of the AHMET to <3% for increased independence and ease with " "performance of ADL, IADL, work, or leisure activities. (Met)       Start:  10/21/24    Expected End:  03/21/25    Resolved:  11/13/24         Patient Stated Goal: \"I just want this to go away so I can walk and sleep through the night.\" (Met)       Start:  10/21/24    Expected End:  03/21/25    Resolved:  11/13/24             General  Chief Complaint:   1. Dorsalgia  Follow Up In Physical Therapy          Subjective:     Current Problem  General  Reason for Referral: Lumbar radiculopathy  Referred By: ray  Past Medical History Relevant to Rehab: Sciatica Right, HBP  General Comment: Onset date: August 2024, script dated 9/19/2024; Auth required: Visit number 4: Patient has done yard work and workouts with addition of HEP to normal gym activity. No issues with sleeping or activity at this time. \"If if get it it is only there, it doesn't stop me from doing anything. \"    Precautions  Precautions Comment: none    Patient perform today's treatment with no noted issues      Pain  Pain Assessment: 0-10  0-10 (Numeric) Pain Score: 0 - No pain    Objective:     Treatments:   This therapist instructed and demonstrated interventions to patient, patient completed the following under direct supervision of this therapist:  18   minutes  Therapeutic Activity  Therapeutic Activity Performed: Yes  Therapeutic Activity 1: REassessment testing and measures and patient eduction on discharge     Education:      Chandler Boss, PT  "

## 2024-12-02 ENCOUNTER — HOSPITAL ENCOUNTER (OUTPATIENT)
Dept: RADIOLOGY | Facility: HOSPITAL | Age: 74
Discharge: HOME | End: 2024-12-02
Payer: MEDICARE

## 2024-12-02 DIAGNOSIS — C61 PROSTATE CANCER (MULTI): ICD-10-CM

## 2024-12-02 PROCEDURE — 76498 UNLISTED MR PROCEDURE: CPT

## 2024-12-02 PROCEDURE — 72197 MRI PELVIS W/O & W/DYE: CPT

## 2024-12-02 PROCEDURE — A9575 INJ GADOTERATE MEGLUMI 0.1ML: HCPCS | Performed by: STUDENT IN AN ORGANIZED HEALTH CARE EDUCATION/TRAINING PROGRAM

## 2024-12-02 PROCEDURE — 2550000001 HC RX 255 CONTRASTS: Performed by: STUDENT IN AN ORGANIZED HEALTH CARE EDUCATION/TRAINING PROGRAM

## 2024-12-02 RX ORDER — GADOTERATE MEGLUMINE 376.9 MG/ML
0.2 INJECTION INTRAVENOUS
Status: CANCELLED | OUTPATIENT
Start: 2024-12-02

## 2024-12-02 RX ORDER — GADOTERATE MEGLUMINE 376.9 MG/ML
0.2 INJECTION INTRAVENOUS
Status: COMPLETED | OUTPATIENT
Start: 2024-12-02 | End: 2024-12-02

## 2024-12-02 ASSESSMENT — ENCOUNTER SYMPTOMS
LOSS OF SENSATION IN FEET: 0
DEPRESSION: 0
OCCASIONAL FEELINGS OF UNSTEADINESS: 0

## 2024-12-05 DIAGNOSIS — I10 BENIGN ESSENTIAL HYPERTENSION: ICD-10-CM

## 2024-12-05 RX ORDER — LISINOPRIL 10 MG/1
10 TABLET ORAL DAILY
Qty: 90 TABLET | Refills: 1 | Status: SHIPPED | OUTPATIENT
Start: 2024-12-05

## 2024-12-06 NOTE — PROGRESS NOTES
Subjective   Patient ID: Faustino Isaacs Jr. is a 74 y.o. male who is present for his 6 month follow up. He has diagnosis of ED and BPH with LUTS.       HPI  74 y.o. male presents with GG2 prostate cancer (20% pattern 4). He has opted to proceed with active surveillance. He is comfortable with the pathology of his cancer.   GPS score of 41, meaning 52% chance of adverse pathological findings on pathology.    He is currently prescribed Cialis 5 mg daily. His most recently ordered PSA was not completed.     MRI prostate showing stable lesion.      MRI Prostate with ESTELA boundaries if PIRADS 3 or above preformed on 12/02/2024:  PROSTATE VOLUME:  The prostate measures  5.7 cm x  3.6 cm  x  4.1 cm in right-to-left,  anterior-posterior and craniocaudal dimension.  Prostate weight is estimated at 44.1g. PSA density is 0.04 ng/mL/g.  IMPRESSION:  1. Stable PI-RADS 4 lesion within the left posterolateral peripheral  zone at the level of the mid-gland.    Review of Systems  A complete review of systems was performed. All systems are noted to be negative unless indicated in the history of present illness, impression, active problem list, or past histories.     Objective   Physical Exam    Assessment/Plan   Diagnoses and all orders for this visit:  Malignant neoplasm of prostate (Multi)  -     Prostate Specific Antigen; Future  -     Urine Culture; Future  -     Urinalysis with Reflex Microscopic; Future  Prophylactic antibiotic  Lower urinary tract symptoms (LUTS)  -     Urine Culture; Future      74 y.o. male presents with GG2 prostate cancer. He has opted to proceed with active surveillance. He is comfortable with the pathology of his cancer. He is currently prescribed Cialis 5 mg daily.     I personally reviewed the medical records of the patient including the lab values, the note of the referring physician and I reviewed the report and visualized the images performed focusing on the most recent MRI results. The reports  demonstrates a stable PI-RADS lesion 4. It demonstrates no change. It does not indicate how the lesion is progressing on the MRI.     If active surveillance, should have initial repeat biopsy 1-3 years. My recommendation is to undergo another biopsy. Biopsy is an important aspect of active surveillance. I would not wait more than 6 months to perform another biopsy considering his elevated GPS score of 41, despite the stable MRI.     He has agreed to proceed with a TRUS. He would like to wait until 01/2025. If that biopsy shows the same results as previous, we do not need to biopsy the area every year. If at any point the PSA value elevates or MRI shows a different result, a biopsy will need to be preformed sooner than 2 years.       Plan:  UA and Culture  TRUS fusion in 01/2025 which is part of active surveillance considering his high GPS score   Continue Cialis 5 mg daily     Scribe Attestation   By signing my name below, I, Allison Guzmán, Scribe attestation that this documentation has been prepared under the direction and in the presence of Kalin Flores MD.

## 2024-12-09 ENCOUNTER — APPOINTMENT (OUTPATIENT)
Dept: UROLOGY | Facility: CLINIC | Age: 74
End: 2024-12-09
Payer: MEDICARE

## 2024-12-09 DIAGNOSIS — C61 MALIGNANT NEOPLASM OF PROSTATE (MULTI): Primary | ICD-10-CM

## 2024-12-09 DIAGNOSIS — R39.9 LOWER URINARY TRACT SYMPTOMS (LUTS): ICD-10-CM

## 2024-12-09 DIAGNOSIS — Z79.2 PROPHYLACTIC ANTIBIOTIC: ICD-10-CM

## 2024-12-09 DIAGNOSIS — R97.20 ELEVATED PSA: ICD-10-CM

## 2024-12-09 PROCEDURE — G2211 COMPLEX E/M VISIT ADD ON: HCPCS | Performed by: STUDENT IN AN ORGANIZED HEALTH CARE EDUCATION/TRAINING PROGRAM

## 2024-12-09 PROCEDURE — 1157F ADVNC CARE PLAN IN RCRD: CPT | Performed by: STUDENT IN AN ORGANIZED HEALTH CARE EDUCATION/TRAINING PROGRAM

## 2024-12-09 PROCEDURE — 1123F ACP DISCUSS/DSCN MKR DOCD: CPT | Performed by: STUDENT IN AN ORGANIZED HEALTH CARE EDUCATION/TRAINING PROGRAM

## 2024-12-09 PROCEDURE — 99214 OFFICE O/P EST MOD 30 MIN: CPT | Performed by: STUDENT IN AN ORGANIZED HEALTH CARE EDUCATION/TRAINING PROGRAM

## 2024-12-09 RX ORDER — CIPROFLOXACIN 500 MG/1
TABLET ORAL
Qty: 3 TABLET | Refills: 0 | Status: SHIPPED | OUTPATIENT
Start: 2024-12-09

## 2025-01-25 ENCOUNTER — PREP FOR PROCEDURE (OUTPATIENT)
Dept: UROLOGY | Facility: HOSPITAL | Age: 75
End: 2025-01-25
Payer: MEDICARE

## 2025-01-25 DIAGNOSIS — C61 PROSTATE CANCER (MULTI): Primary | ICD-10-CM

## 2025-02-03 DIAGNOSIS — N40.1 BENIGN PROSTATIC HYPERPLASIA WITH LOWER URINARY TRACT SYMPTOMS, SYMPTOM DETAILS UNSPECIFIED: ICD-10-CM

## 2025-02-03 NOTE — DISCHARGE INSTRUCTIONS
DEPARTMENT OF UROLOGY  DISCHARGE INSTRUCTIONS -- Prostate Biopsy  Outpatient Surgery    C O N F I D E N T I A L   I N F O R M A T I O N    Faustino Isaacs JrBertha        Call [phone] during regular daytime business hours (8:00 am - 5:00 pm) and after 5:00 pm and ask for the Urology Resident with any questions or concerns.      If it is a life-threatening situation, proceed to the nearest emergency department.        Follow-up appointment:  2/24/25     Thank you for the opportunity to care for you today.  Your health and healing are very important to us.  We hope we made you feel as comfortable as possible and are committed to your recovery and continued well-being.      The following is a brief overview of your prostate biopsy today. Some of the information contained on this summary may be confidential.  This information should be kept in your records and should be shared with your regular doctor.    Physicians:   Dr. Flores       Procedure performed: prostate biopsy  Pending results:   prostate pathology results    What to Expect During your Recovery and Home Care  Anesthesia Side Effects   You received no anesthesia today.  You may feel sleepy, tired, or have a sore throat.   You may also feel drowsiness, dizziness, or inability to think clearly.  For your safety, do not drive, drink alcoholic beverages, take any unprescribed medication or make any important decisions for 24 hours.  A responsible adult should be with you for 24 hours.        Activity and Recovery    No heavy lifting or strenuous activity for several days. Avoid activities that put pressure on your rectal area. Do not have sex for a few days.      Pain Control  Unfortunately, you may experience pain after your procedure.  Adequate management can include alternative measures to help ease your pain and can include ice packs, and over the counter Tylenol can be taken as prescribed as needed for breakthrough pain. Do not take more then 4,000mg of  Tylenol in a 24-hour period.      Nausea/Vomiting   Clear liquids are best tolerated at first. Start slow, advance your diet as tolerated to normal foods. Avoid spicy, greasy, heavy foods at first. Also, you may feel nauseous or like you need to vomit if you take any type of medication on an empty stomach.  Call your physician if you are unable to eat or drink and have persistent vomiting.    Signs of Bleeding   Minor bleeding or drainage may occur from your rectum however, excessive or consistent bleeding should be reported to your surgeon. Excessive bleeding is defined as blood that is dripping from rectum, soaking through your pants, and is ketchup colored, thick with possible blood clots.  Consistent is defined as bleeding that does not stop. You may have blood in your poop, urine and semen for several weeks.     Treatment/wound care:   It is okay to shower 24 hours after time of surgery.      Signs of Infection  Signs of infection can include fever, burning sensation with urination, frequency, urgency or severe pain.  If you see any of these occur, please contact your doctor's office at [phone].  Any fever higher than 100.4, especially if associated with an ill feeling, abdominal pain, chills, or nausea should be reported to your surgeon.      Assist in bowel movements/urination  Increase fiber in diet  Urination should occur within 6 hours of anesthesia.   Increase water (6 to 8 glasses)  Increase walking   If you have tried these methods and your bladder still feels full and you cannot use the bathroom, please go to your nearest Emergency room.    Additional Instructions:   Try not to strain when going to the bathroom. Do not hold your urine. We will go over your biopsy results at your follow up appointment. It takes 7-10 business days for the results to come back.

## 2025-02-04 ENCOUNTER — HOSPITAL ENCOUNTER (OUTPATIENT)
Facility: HOSPITAL | Age: 75
Setting detail: OUTPATIENT SURGERY
Discharge: HOME | End: 2025-02-04
Attending: STUDENT IN AN ORGANIZED HEALTH CARE EDUCATION/TRAINING PROGRAM | Admitting: STUDENT IN AN ORGANIZED HEALTH CARE EDUCATION/TRAINING PROGRAM
Payer: MEDICARE

## 2025-02-04 VITALS
RESPIRATION RATE: 17 BRPM | DIASTOLIC BLOOD PRESSURE: 73 MMHG | SYSTOLIC BLOOD PRESSURE: 173 MMHG | OXYGEN SATURATION: 98 % | HEIGHT: 72 IN | WEIGHT: 213.85 LBS | TEMPERATURE: 98.6 F | BODY MASS INDEX: 28.96 KG/M2 | HEART RATE: 60 BPM

## 2025-02-04 DIAGNOSIS — C61 PROSTATE CANCER (MULTI): ICD-10-CM

## 2025-02-04 PROCEDURE — 96372 THER/PROPH/DIAG INJ SC/IM: CPT | Performed by: STUDENT IN AN ORGANIZED HEALTH CARE EDUCATION/TRAINING PROGRAM

## 2025-02-04 PROCEDURE — 3600000004 HC OR TIME - INITIAL BASE CHARGE - PROCEDURE LEVEL FOUR: Performed by: STUDENT IN AN ORGANIZED HEALTH CARE EDUCATION/TRAINING PROGRAM

## 2025-02-04 PROCEDURE — 7100000009 HC PHASE TWO TIME - INITIAL BASE CHARGE: Performed by: STUDENT IN AN ORGANIZED HEALTH CARE EDUCATION/TRAINING PROGRAM

## 2025-02-04 PROCEDURE — G0416 PROSTATE BIOPSY, ANY MTHD: HCPCS | Mod: TC,GEALAB | Performed by: STUDENT IN AN ORGANIZED HEALTH CARE EDUCATION/TRAINING PROGRAM

## 2025-02-04 PROCEDURE — 7100000010 HC PHASE TWO TIME - EACH INCREMENTAL 1 MINUTE: Performed by: STUDENT IN AN ORGANIZED HEALTH CARE EDUCATION/TRAINING PROGRAM

## 2025-02-04 PROCEDURE — 2500000004 HC RX 250 GENERAL PHARMACY W/ HCPCS (ALT 636 FOR OP/ED): Performed by: STUDENT IN AN ORGANIZED HEALTH CARE EDUCATION/TRAINING PROGRAM

## 2025-02-04 PROCEDURE — 3600000009 HC OR TIME - EACH INCREMENTAL 1 MINUTE - PROCEDURE LEVEL FOUR: Performed by: STUDENT IN AN ORGANIZED HEALTH CARE EDUCATION/TRAINING PROGRAM

## 2025-02-04 PROCEDURE — 76872 US TRANSRECTAL: CPT | Performed by: STUDENT IN AN ORGANIZED HEALTH CARE EDUCATION/TRAINING PROGRAM

## 2025-02-04 PROCEDURE — 76942 ECHO GUIDE FOR BIOPSY: CPT | Performed by: STUDENT IN AN ORGANIZED HEALTH CARE EDUCATION/TRAINING PROGRAM

## 2025-02-04 PROCEDURE — C1819 TISSUE LOCALIZATION-EXCISION: HCPCS | Performed by: STUDENT IN AN ORGANIZED HEALTH CARE EDUCATION/TRAINING PROGRAM

## 2025-02-04 PROCEDURE — 2720000007 HC OR 272 NO HCPCS: Performed by: STUDENT IN AN ORGANIZED HEALTH CARE EDUCATION/TRAINING PROGRAM

## 2025-02-04 PROCEDURE — 55700 PR PROSTATE NEEDLE BIOPSY ANY APPROACH: CPT | Performed by: STUDENT IN AN ORGANIZED HEALTH CARE EDUCATION/TRAINING PROGRAM

## 2025-02-04 RX ORDER — GENTAMICIN 40 MG/ML
80 INJECTION, SOLUTION INTRAMUSCULAR; INTRAVENOUS ONCE
Status: COMPLETED | OUTPATIENT
Start: 2025-02-04 | End: 2025-02-04

## 2025-02-04 RX ORDER — LIDOCAINE HYDROCHLORIDE 10 MG/ML
INJECTION, SOLUTION INFILTRATION; PERINEURAL AS NEEDED
Status: DISCONTINUED | OUTPATIENT
Start: 2025-02-04 | End: 2025-02-04 | Stop reason: HOSPADM

## 2025-02-04 RX ADMIN — GENTAMICIN SULFATE 80 MG: 40 INJECTION, SOLUTION INTRAMUSCULAR; INTRAVENOUS at 13:08

## 2025-02-04 ASSESSMENT — PAIN SCALES - GENERAL: PAINLEVEL_OUTOF10: 0 - NO PAIN

## 2025-02-04 ASSESSMENT — COLUMBIA-SUICIDE SEVERITY RATING SCALE - C-SSRS
1. IN THE PAST MONTH, HAVE YOU WISHED YOU WERE DEAD OR WISHED YOU COULD GO TO SLEEP AND NOT WAKE UP?: NO
2. HAVE YOU ACTUALLY HAD ANY THOUGHTS OF KILLING YOURSELF?: NO
6. HAVE YOU EVER DONE ANYTHING, STARTED TO DO ANYTHING, OR PREPARED TO DO ANYTHING TO END YOUR LIFE?: NO

## 2025-02-04 ASSESSMENT — PAIN - FUNCTIONAL ASSESSMENT: PAIN_FUNCTIONAL_ASSESSMENT: 0-10

## 2025-02-04 NOTE — H&P
Patient ID: Faustino Isaacs Jr. is a 74 y.o. male who is present for his 6 month follow up. He has diagnosis of ED and BPH with LUTS.         HPI  74 y.o. male presents with GG2 prostate cancer (20% pattern 4). He has opted to proceed with active surveillance. He is comfortable with the pathology of his cancer.   GPS score of 41, meaning 52% chance of adverse pathological findings on pathology.     He is currently prescribed Cialis 5 mg daily. His most recently ordered PSA was not completed.      MRI prostate showing stable lesion.        MRI Prostate with ESTELA boundaries if PIRADS 3 or above preformed on 12/02/2024:  PROSTATE VOLUME:  The prostate measures  5.7 cm x  3.6 cm  x  4.1 cm in right-to-left,  anterior-posterior and craniocaudal dimension.  Prostate weight is estimated at 44.1g. PSA density is 0.04 ng/mL/g.  IMPRESSION:  1. Stable PI-RADS 4 lesion within the left posterolateral peripheral  zone at the level of the mid-gland.     Review of Systems  A complete review of systems was performed. All systems are noted to be negative unless indicated in the history of present illness, impression, active problem list, or past histories.         Objective  Physical Exam        Assessment/Plan  Diagnoses and all orders for this visit:  Malignant neoplasm of prostate (Multi)  -     Prostate Specific Antigen; Future  -     Urine Culture; Future  -     Urinalysis with Reflex Microscopic; Future  Prophylactic antibiotic  Lower urinary tract symptoms (LUTS)  -     Urine Culture; Future        74 y.o. male presents with GG2 prostate cancer. He has opted to proceed with active surveillance. He is comfortable with the pathology of his cancer. He is currently prescribed Cialis 5 mg daily.      I personally reviewed the medical records of the patient including the lab values, the note of the referring physician and I reviewed the report and visualized the images performed focusing on the most recent MRI results. The reports  demonstrates a stable PI-RADS lesion 4. It demonstrates no change. It does not indicate how the lesion is progressing on the MRI.      If active surveillance, should have initial repeat biopsy 1-3 years. My recommendation is to undergo another biopsy. Biopsy is an important aspect of active surveillance. I would not wait more than 6 months to perform another biopsy considering his elevated GPS score of 41, despite the stable MRI.      He has agreed to proceed with a TRUS. He would like to wait until 01/2025. If that biopsy shows the same results as previous, we do not need to biopsy the area every year. If at any point the PSA value elevates or MRI shows a different result, a biopsy will need to be preformed sooner than 2 years.         Plan:  UA and Culture  TRUS fusion in 01/2025 which is part of active surveillance considering his high GPS score     I have reviewed the patient's History and Physical Examination. I have personally seen and evaluated the patient, repeating key portions. There is no significant interval change.     Surgery is still indicated. Yes    Consent reviewed and signed by patient/family: Yes    Operative site verified and marked: Yes     Reviewed and approved by DEANDRA CUNNINGHAM on 2/4/25 at 1:16 PM.

## 2025-02-04 NOTE — OP NOTE
BIOPSY, PROSTATE, WITH IMAGING GUIDANCE Operative Note     Date: 2025  OR Location: [facility] OR    Name: Faustino Isaacs Jr., : 1950, Age: 74 y.o., MRN: [id number], Sex: male    Diagnosis  Pre-op Diagnosis      * Prostate cancer (Multi) [C61] Post-op Diagnosis     * Prostate cancer (Multi) [C61]     Procedures  BIOPSY, PROSTATE, WITH IMAGING GUIDANCE  13927 - MN PROSTATE NEEDLE BIOPSY ANY APPROACH    CHG US TRANSRECTAL [69618]  CHG US GUIDANCE NEEDLE PLACEMENT IMG S&I [35521]  Surgeons      * Kalin Flores - Primary    Resident/Fellow/Other Assistant:  Surgeons and Role:  * No surgeons found with a matching role *    Staff:   Circulator: Soco Ramos Person: Seema  Surgical Assistant: Manish    Anesthesia Staff: Anesthesiologist: Matthias Jensen MD  CRNA: YAYA Wolfe-INGRID    Procedure Summary  Anesthesia: Anesthesia type not filed in the log.  ASA: ASA status not filed in the log.  Estimated Blood Loss: 1 mL  Intra-op Medications: Administrations occurring from 1527 to 1612 on 25:  * No intraprocedure medications in log *        Specimen:   ID Type Source Tests Collected by Time   1 : REGION OF INTEREST #1 Tissue PROSTATE BIOPSY TARGETED ESTELA SURGICAL PATHOLOGY EXAM Rashed KAYLI Flores MD 2025 1303   2 : RIGHT BASE PROSTATE BIOPSY Tissue PROSTATE, BIOPSY, RIGHT BASE SURGICAL PATHOLOGY EXAM Adelaed KAYLI Flores MD 2025 1303   3 : RIGHT MID PROSTATE BIOPSY Tissue PROSTATE, BIOPSY, RIGHT MID SURGICAL PATHOLOGY EXAM Rashed KAYLI Flores MD 2025 1303   4 : RIGHT APEX PROSTATE BIOPSY Tissue PROSTATE, BIOPSY, RIGHT APEX SURGICAL PATHOLOGY EXAM Adelaed KAYLI Flores MD 2025 1303   5 : LEFT BASE PROSTATE BIOPSY Tissue PROSTATE, BIOPSY, LEFT BASE SURGICAL PATHOLOGY EXAM Adelaed KAYLI Flores MD 2025 1303   6 : LEFT MID PROSTATE BIOPSY Tissue PROSTATE, BIOPSY, LEFT MID SURGICAL PATHOLOGY EXAM Rashed KAYLI Flores MD 2025 1303   7 : LEFT APEX PROSTATE BIOPSY Tissue PROSTATE, BIOPSY, LEFT  APEX SURGICAL PATHOLOGY EXAM Kalin Flores MD 2/4/2025 1303                 Drains and/or Catheters: * None in log *    Tourniquet Times:         Implants:     Findings: as per operative report    Indications: Faustino Isaacs Jr. is an 74 y.o. male who is having surgery for Prostate cancer (Multi) [C61]. GG2 with stable PSA with stable lesion on MRI.    The patient was seen in the preoperative area. The risks, benefits, complications, treatment options, non-operative alternatives, expected recovery and outcomes were discussed with the patient. The possibilities of reaction to medication, pulmonary aspiration, injury to surrounding structures, bleeding, recurrent infection, the need for additional procedures, failure to diagnose a condition, and creating a complication requiring transfusion or operation were discussed with the patient. The patient concurred with the proposed plan, giving informed consent.  The site of surgery was properly noted/marked if necessary per policy. The patient has been actively warmed in preoperative area. Preoperative antibiotics have been ordered and given within 1 hours of incision. Venous thrombosis prophylaxis are not indicated.    Procedure Details: 1. TRANSRECTAL ULTRASOUND OF PROSTATE    2. ULTRASOUND GUIDED FUSION FOR BIOPSY  3. TARGETED FUSION BIOPSY OF PROSTATE    Patient was consent and antibiotics were administered on-call to the OR.  In the operating room, patient in lateral position, the transrectal ultrasound probe was inserted, and mapping of the prostate was performed.    The prostate appeared to be homogenous with a TURP defect. It measured W: 52.3mm x L: 39.5mm x H: 33.3mm, for a volume of 36 ml.    Local anesthetic was administered in Denonvillier's fascia.  4 cores were obtained from the region of interest, then 12 systematic biopsies were obtained.  Patient tolerated procedure well, there was minimal bleeding, and he was transferred to PACU in stable  condition.      Complications:  None; patient tolerated the procedure well.    Disposition: PACU - hemodynamically stable.  Condition: stable         Task Performed by RNFA or Surgical Assistant:    Additional Details:     Attending Attestation: I performed the procedure.    Kalin Flores  Phone Number: [phone]

## 2025-02-07 RX ORDER — TADALAFIL 5 MG/1
5 TABLET ORAL DAILY
Qty: 30 TABLET | Refills: 0 | Status: SHIPPED | OUTPATIENT
Start: 2025-02-07

## 2025-02-10 LAB
LABORATORY COMMENT REPORT: NORMAL
PATH REPORT.FINAL DX SPEC: NORMAL
PATH REPORT.GROSS SPEC: NORMAL
PATH REPORT.RELEVANT HX SPEC: NORMAL
PATH REPORT.TOTAL CANCER: NORMAL

## 2025-02-21 NOTE — PROGRESS NOTES
Subjective   Patient ID: Faustino Isaacs Jr. is a 74 y.o. male.      HPI  74 y.o. male presents status post TRUS fusion completed on 02/04/2025 as part of active surveillance. The patient is recovering well postoperatively. He denies signs or symptoms of complications or infection. He returns for pathology results. He has hematuria occasionally. He is also experiencing hematospermia.     He has a history of GG2 prostate cancer with a GPS score of 41, meaning a 52% chance of adverse pathological findings on pathology.     He is currently prescribed Cialis 5 mg daily. He is satisfied with the medication.       Surgical Pathology Exam completed on 02/04/2025:  FINAL DIAGNOSIS   A. Prostate, region of interest #1, biopsy:  -- Prostatic adenocarcinoma, [place] score 3+4=7 (Grade group 2), involving three of five cores and approximately 10% of the specimen     Note: Win pattern 4 comprises approximately less than 5% of the overall tumor volume. Cribriform growth is not identified.     B. Prostate, right base, biopsy:  -- Benign prostatic tissue     C. Prostate, right mid, biopsy:  -- Focal high-grade prostatic intraepithelial neoplasia (HGPIN)     D. Prostate, right apex, biopsy:  -- Benign prostatic tissue     E. Prostate, left base, biopsy:  -- Prostatic adenocarcinoma, Win score 3+3=6 (Grade group 1), discontinuously involving two of two cores and approximately 65% of the specimen     F. Prostate, left mid, biopsy:  -- Prostatic adenocarcinoma, Win score 3+3=6 (Grade group 1), involving one of two cores and approximately 8% of the specimen     G. Prostate, left apex, biopsy:  -- Benign prostatic tissue         Review of Systems  A complete review of systems was performed. All systems are noted to be negative unless indicated in the history of present illness, impression, active problem list, or past histories.     Objective   Physical Exam    Assessment/Plan   Diagnoses and all orders for this  visit:  Malignant neoplasm of prostate (Multi)  -     PSA; Future  Benign prostatic hyperplasia with lower urinary tract symptoms, symptom details unspecified  -     tadalafil (Cialis) 5 mg tablet; Take 1 tablet (5 mg) by mouth once daily.      74 y.o. male presents status post TRUS fusion completed on 02/04/2025 as part of active surveillance. He returns for pathology results.     I personally reviewed the surgical pathology that was collected during the TRUS biopsy on 02/04/2025. I have reviewed the results with the patient that indicate a Prostatic adenocarcinoma, [place] score 3+4=7 (Grade group 2), involving three of five cores and approximately 10% of the specimen. As a result of the biopsy, it The prostate cancer does not seem to be active at this time. Biopsy has not shown worse cancer than on the MRI. I reassured the patient and his wife, that I am not concerned at this time.     We will wait 3 months prior to obtaining a PSA due to his recent biopsy. His last PSA was 1.8. We will continue with an MRI every 2 years for active surveillance. A biopsy will only be completed if required.     We will refill his Cialis 5 mg daily for 1 year.     Plan:  Refill Cialis 5 mg daily   PSA in 06/2025  FUV in 06/2025    Scribe Attestation   By signing my name below, I, Feli Calabreseibchantel attestation that this documentation has been prepared under the direction and in the presence of Kalin Flores MD.

## 2025-02-24 ENCOUNTER — APPOINTMENT (OUTPATIENT)
Dept: UROLOGY | Facility: CLINIC | Age: 75
End: 2025-02-24
Payer: MEDICARE

## 2025-02-24 DIAGNOSIS — C61 MALIGNANT NEOPLASM OF PROSTATE (MULTI): Primary | ICD-10-CM

## 2025-02-24 DIAGNOSIS — N40.1 BENIGN PROSTATIC HYPERPLASIA WITH LOWER URINARY TRACT SYMPTOMS, SYMPTOM DETAILS UNSPECIFIED: ICD-10-CM

## 2025-02-24 PROCEDURE — 1123F ACP DISCUSS/DSCN MKR DOCD: CPT | Performed by: STUDENT IN AN ORGANIZED HEALTH CARE EDUCATION/TRAINING PROGRAM

## 2025-02-24 PROCEDURE — 1157F ADVNC CARE PLAN IN RCRD: CPT | Performed by: STUDENT IN AN ORGANIZED HEALTH CARE EDUCATION/TRAINING PROGRAM

## 2025-02-24 PROCEDURE — G2211 COMPLEX E/M VISIT ADD ON: HCPCS | Performed by: STUDENT IN AN ORGANIZED HEALTH CARE EDUCATION/TRAINING PROGRAM

## 2025-02-24 PROCEDURE — 99213 OFFICE O/P EST LOW 20 MIN: CPT | Performed by: STUDENT IN AN ORGANIZED HEALTH CARE EDUCATION/TRAINING PROGRAM

## 2025-02-24 PROCEDURE — 1159F MED LIST DOCD IN RCRD: CPT | Performed by: STUDENT IN AN ORGANIZED HEALTH CARE EDUCATION/TRAINING PROGRAM

## 2025-02-24 RX ORDER — TADALAFIL 5 MG/1
5 TABLET ORAL DAILY
Qty: 30 TABLET | Refills: 11 | Status: SHIPPED | OUTPATIENT
Start: 2025-02-24 | End: 2026-02-24

## 2025-02-24 ASSESSMENT — COLUMBIA-SUICIDE SEVERITY RATING SCALE - C-SSRS
6. HAVE YOU EVER DONE ANYTHING, STARTED TO DO ANYTHING, OR PREPARED TO DO ANYTHING TO END YOUR LIFE?: NO
1. IN THE PAST MONTH, HAVE YOU WISHED YOU WERE DEAD OR WISHED YOU COULD GO TO SLEEP AND NOT WAKE UP?: NO
2. HAVE YOU ACTUALLY HAD ANY THOUGHTS OF KILLING YOURSELF?: NO

## 2025-03-19 ENCOUNTER — APPOINTMENT (OUTPATIENT)
Dept: PRIMARY CARE | Facility: CLINIC | Age: 75
End: 2025-03-19
Payer: MEDICARE

## 2025-03-19 ENCOUNTER — HOSPITAL ENCOUNTER (OUTPATIENT)
Dept: RADIOLOGY | Facility: HOSPITAL | Age: 75
Discharge: HOME | End: 2025-03-19
Payer: MEDICARE

## 2025-03-19 VITALS
OXYGEN SATURATION: 97 % | WEIGHT: 212.2 LBS | HEART RATE: 57 BPM | SYSTOLIC BLOOD PRESSURE: 138 MMHG | DIASTOLIC BLOOD PRESSURE: 72 MMHG | BODY MASS INDEX: 27.23 KG/M2 | HEIGHT: 74 IN

## 2025-03-19 DIAGNOSIS — E78.00 HYPERCHOLESTEROLEMIA: ICD-10-CM

## 2025-03-19 DIAGNOSIS — M25.562 CHRONIC PAIN OF LEFT KNEE: ICD-10-CM

## 2025-03-19 DIAGNOSIS — G89.29 CHRONIC PAIN OF LEFT KNEE: ICD-10-CM

## 2025-03-19 DIAGNOSIS — E55.9 VITAMIN D DEFICIENCY: ICD-10-CM

## 2025-03-19 DIAGNOSIS — Z00.00 ROUTINE GENERAL MEDICAL EXAMINATION AT HEALTH CARE FACILITY: Primary | ICD-10-CM

## 2025-03-19 DIAGNOSIS — I10 BENIGN ESSENTIAL HYPERTENSION: ICD-10-CM

## 2025-03-19 PROCEDURE — 1157F ADVNC CARE PLAN IN RCRD: CPT | Performed by: STUDENT IN AN ORGANIZED HEALTH CARE EDUCATION/TRAINING PROGRAM

## 2025-03-19 PROCEDURE — 1036F TOBACCO NON-USER: CPT | Performed by: STUDENT IN AN ORGANIZED HEALTH CARE EDUCATION/TRAINING PROGRAM

## 2025-03-19 PROCEDURE — 1159F MED LIST DOCD IN RCRD: CPT | Performed by: STUDENT IN AN ORGANIZED HEALTH CARE EDUCATION/TRAINING PROGRAM

## 2025-03-19 PROCEDURE — 99214 OFFICE O/P EST MOD 30 MIN: CPT | Performed by: STUDENT IN AN ORGANIZED HEALTH CARE EDUCATION/TRAINING PROGRAM

## 2025-03-19 PROCEDURE — 1123F ACP DISCUSS/DSCN MKR DOCD: CPT | Performed by: STUDENT IN AN ORGANIZED HEALTH CARE EDUCATION/TRAINING PROGRAM

## 2025-03-19 PROCEDURE — 1170F FXNL STATUS ASSESSED: CPT | Performed by: STUDENT IN AN ORGANIZED HEALTH CARE EDUCATION/TRAINING PROGRAM

## 2025-03-19 PROCEDURE — 73562 X-RAY EXAM OF KNEE 3: CPT | Mod: LEFT SIDE | Performed by: RADIOLOGY

## 2025-03-19 PROCEDURE — 3075F SYST BP GE 130 - 139MM HG: CPT | Performed by: STUDENT IN AN ORGANIZED HEALTH CARE EDUCATION/TRAINING PROGRAM

## 2025-03-19 PROCEDURE — 3078F DIAST BP <80 MM HG: CPT | Performed by: STUDENT IN AN ORGANIZED HEALTH CARE EDUCATION/TRAINING PROGRAM

## 2025-03-19 PROCEDURE — 73562 X-RAY EXAM OF KNEE 3: CPT | Mod: LT

## 2025-03-19 PROCEDURE — 99397 PER PM REEVAL EST PAT 65+ YR: CPT | Performed by: STUDENT IN AN ORGANIZED HEALTH CARE EDUCATION/TRAINING PROGRAM

## 2025-03-19 PROCEDURE — G0439 PPPS, SUBSEQ VISIT: HCPCS | Performed by: STUDENT IN AN ORGANIZED HEALTH CARE EDUCATION/TRAINING PROGRAM

## 2025-03-19 RX ORDER — MELOXICAM 15 MG/1
15 TABLET ORAL DAILY
Qty: 30 TABLET | Refills: 1 | Status: SHIPPED | OUTPATIENT
Start: 2025-03-19 | End: 2025-05-18

## 2025-03-19 ASSESSMENT — ACTIVITIES OF DAILY LIVING (ADL)
DRESSING: INDEPENDENT
DOING_HOUSEWORK: INDEPENDENT
TAKING_MEDICATION: INDEPENDENT
MANAGING_FINANCES: INDEPENDENT
BATHING: INDEPENDENT
GROCERY_SHOPPING: INDEPENDENT

## 2025-03-19 NOTE — ASSESSMENT & PLAN NOTE
Orders:    CBC and Auto Differential; Future    Comprehensive metabolic panel; Future    Lipid Panel; Future    Albumin-Creatinine Ratio, Urine Random; Future

## 2025-03-19 NOTE — PROGRESS NOTES
"Subjective   Reason for Visit: Faustino Isaacs Jr. is an 75 y.o. male here for a Medicare Wellness visit.     Past Medical, Surgical, and Family History reviewed and updated in chart.    Reviewed all medications by prescribing practitioner or clinical pharmacist (such as prescriptions, OTCs, herbal therapies and supplements) and documented in the medical record.    HPI    Knee L, chronic   Pain on the sharp medial knee   Worsening symptoms   Constant, 6/10  Denies any trauma      Patient Care Team:  Susy Mason, DO as PCP - General (Family Medicine)  Susy Mason,  as PCP - [employer] PCP     Objective   Vitals:  /72   Pulse 57   Ht 1.88 m (6' 2\")   Wt 96.3 kg (212 lb 3.2 oz)   SpO2 97%   BMI 27.24 kg/m²       Physical Exam  Vitals reviewed.   Constitutional:       General: He is not in acute distress.     Appearance: He is not ill-appearing.   HENT:      Right Ear: Tympanic membrane and ear canal normal.      Left Ear: Tympanic membrane and ear canal normal.      Mouth/Throat:      Mouth: Mucous membranes are moist.      Pharynx: Oropharynx is clear. No oropharyngeal exudate or posterior oropharyngeal erythema.   Eyes:      Extraocular Movements: Extraocular movements intact.      Conjunctiva/sclera: Conjunctivae normal.      Pupils: Pupils are equal, round, and reactive to light.   Neck:      Vascular: No carotid bruit.   Cardiovascular:      Rate and Rhythm: Normal rate and regular rhythm.      Heart sounds: No murmur heard.     No gallop.   Pulmonary:      Effort: Pulmonary effort is normal.      Breath sounds: Normal breath sounds. No wheezing, rhonchi or rales.   Abdominal:      General: Abdomen is flat. Bowel sounds are normal.      Palpations: Abdomen is soft.      Tenderness: There is no abdominal tenderness.   Musculoskeletal:      Cervical back: Neck supple.      Left lower leg: No edema.      Comments: Medial joint line pain with palpation   Natalie negative   No pain with " varus or valgus stress   Skin:     General: Skin is warm and dry.      Findings: No rash.   Neurological:      General: No focal deficit present.      Mental Status: He is alert and oriented to person, place, and time.      Gait: Gait normal.   Psychiatric:         Mood and Affect: Mood normal.         Behavior: Behavior normal.         Assessment & Plan  Routine general medical examination at health care facility         Benign essential hypertension    Orders:    CBC and Auto Differential; Future    Comprehensive metabolic panel; Future    Lipid Panel; Future    Albumin-Creatinine Ratio, Urine Random; Future    Vitamin D deficiency    Orders:    Vitamin D 25-Hydroxy,Total (for eval of Vitamin D levels); Future    Chronic pain of left knee    Orders:    XR knee left 1-2 views; Future    meloxicam (Mobic) 15 mg tablet; Take 1 tablet (15 mg) by mouth once daily.    Hypercholesterolemia    Orders:    Lipid Panel; Future     Mr. Isaacs is a 76 yo male here today for f/u for HTN.     HTN- well controlled  BP controlled  138/72 mmHg   refilled lisinopril 10mg daily  -watch salt in diet  -work on weight loss  -work to get regular exercise  Labs: cbc, cmp      Prostate Ca  Bx postiive for adenocarcinoma of the prostate   following with urology   Currently opting for active surveillance   PSA 1.3 Q6 month testing  3 MONTHS FU PSA  MRI EVERY 2 YEARS      Knee L, chronic   Xray ordered   Meloxicam given     Denies any trauma  Health Maintenance   Labs: cbc, cmp, albumin      rtc in 6 months for HTN follow up and mwv

## 2025-03-20 DIAGNOSIS — M25.562 CHRONIC PAIN OF LEFT KNEE: Primary | ICD-10-CM

## 2025-03-20 DIAGNOSIS — G89.29 CHRONIC PAIN OF LEFT KNEE: Primary | ICD-10-CM

## 2025-03-20 LAB
25(OH)D3+25(OH)D2 SERPL-MCNC: 28 NG/ML (ref 30–100)
ALBUMIN SERPL-MCNC: 5 G/DL (ref 3.6–5.1)
ALBUMIN/CREAT UR: NORMAL MG/G CREAT
ALP SERPL-CCNC: 64 U/L (ref 35–144)
ALT SERPL-CCNC: 19 U/L (ref 9–46)
ANION GAP SERPL CALCULATED.4IONS-SCNC: 10 MMOL/L (CALC) (ref 7–17)
AST SERPL-CCNC: 27 U/L (ref 10–35)
BASOPHILS # BLD AUTO: 18 CELLS/UL (ref 0–200)
BASOPHILS NFR BLD AUTO: 0.4 %
BILIRUB SERPL-MCNC: 0.7 MG/DL (ref 0.2–1.2)
BUN SERPL-MCNC: 21 MG/DL (ref 7–25)
CALCIUM SERPL-MCNC: 9.7 MG/DL (ref 8.6–10.3)
CHLORIDE SERPL-SCNC: 102 MMOL/L (ref 98–110)
CHOLEST SERPL-MCNC: 214 MG/DL
CHOLEST/HDLC SERPL: 4.1 (CALC)
CO2 SERPL-SCNC: 27 MMOL/L (ref 20–32)
CREAT SERPL-MCNC: 0.91 MG/DL (ref 0.7–1.28)
CREAT UR-MCNC: 53 MG/DL (ref 20–320)
EGFRCR SERPLBLD CKD-EPI 2021: 88 ML/MIN/1.73M2
EOSINOPHIL # BLD AUTO: 108 CELLS/UL (ref 15–500)
EOSINOPHIL NFR BLD AUTO: 2.4 %
ERYTHROCYTE [DISTWIDTH] IN BLOOD BY AUTOMATED COUNT: 12 % (ref 11–15)
GLUCOSE SERPL-MCNC: 111 MG/DL (ref 65–99)
HCT VFR BLD AUTO: 39.7 % (ref 38.5–50)
HDLC SERPL-MCNC: 52 MG/DL
HGB BLD-MCNC: 13.6 G/DL (ref 13.2–17.1)
LDLC SERPL CALC-MCNC: 135 MG/DL (CALC)
LYMPHOCYTES # BLD AUTO: 1179 CELLS/UL (ref 850–3900)
LYMPHOCYTES NFR BLD AUTO: 26.2 %
MCH RBC QN AUTO: 33.6 PG (ref 27–33)
MCHC RBC AUTO-ENTMCNC: 34.3 G/DL (ref 32–36)
MCV RBC AUTO: 98 FL (ref 80–100)
MICROALBUMIN UR-MCNC: <0.2 MG/DL
MONOCYTES # BLD AUTO: 468 CELLS/UL (ref 200–950)
MONOCYTES NFR BLD AUTO: 10.4 %
NEUTROPHILS # BLD AUTO: 2727 CELLS/UL (ref 1500–7800)
NEUTROPHILS NFR BLD AUTO: 60.6 %
NONHDLC SERPL-MCNC: 162 MG/DL (CALC)
PLATELET # BLD AUTO: 241 THOUSAND/UL (ref 140–400)
PMV BLD REES-ECKER: 9.7 FL (ref 7.5–12.5)
POTASSIUM SERPL-SCNC: 4.6 MMOL/L (ref 3.5–5.3)
PROT SERPL-MCNC: 7.2 G/DL (ref 6.1–8.1)
RBC # BLD AUTO: 4.05 MILLION/UL (ref 4.2–5.8)
SODIUM SERPL-SCNC: 139 MMOL/L (ref 135–146)
TRIGL SERPL-MCNC: 147 MG/DL
WBC # BLD AUTO: 4.5 THOUSAND/UL (ref 3.8–10.8)

## 2025-04-03 ENCOUNTER — APPOINTMENT (OUTPATIENT)
Dept: ORTHOPEDIC SURGERY | Facility: CLINIC | Age: 75
End: 2025-04-03
Payer: MEDICARE

## 2025-04-03 DIAGNOSIS — M25.562 CHRONIC PAIN OF LEFT KNEE: Primary | ICD-10-CM

## 2025-04-03 DIAGNOSIS — G89.29 CHRONIC PAIN OF LEFT KNEE: Primary | ICD-10-CM

## 2025-04-03 PROCEDURE — 1159F MED LIST DOCD IN RCRD: CPT | Performed by: ORTHOPAEDIC SURGERY

## 2025-04-03 PROCEDURE — 99204 OFFICE O/P NEW MOD 45 MIN: CPT | Performed by: ORTHOPAEDIC SURGERY

## 2025-04-03 PROCEDURE — 1160F RVW MEDS BY RX/DR IN RCRD: CPT | Performed by: ORTHOPAEDIC SURGERY

## 2025-04-03 PROCEDURE — 1036F TOBACCO NON-USER: CPT | Performed by: ORTHOPAEDIC SURGERY

## 2025-04-03 PROCEDURE — 1125F AMNT PAIN NOTED PAIN PRSNT: CPT | Performed by: ORTHOPAEDIC SURGERY

## 2025-04-03 PROCEDURE — 1123F ACP DISCUSS/DSCN MKR DOCD: CPT | Performed by: ORTHOPAEDIC SURGERY

## 2025-04-03 PROCEDURE — 1157F ADVNC CARE PLAN IN RCRD: CPT | Performed by: ORTHOPAEDIC SURGERY

## 2025-04-03 PROCEDURE — 20610 DRAIN/INJ JOINT/BURSA W/O US: CPT | Performed by: ORTHOPAEDIC SURGERY

## 2025-04-03 RX ORDER — TRIAMCINOLONE ACETONIDE 40 MG/ML
1 INJECTION, SUSPENSION INTRA-ARTICULAR; INTRAMUSCULAR
Status: COMPLETED | OUTPATIENT
Start: 2025-04-03 | End: 2025-04-03

## 2025-04-03 RX ADMIN — TRIAMCINOLONE ACETONIDE 1 ML: 40 INJECTION, SUSPENSION INTRA-ARTICULAR; INTRAMUSCULAR at 09:50

## 2025-04-03 ASSESSMENT — PAIN - FUNCTIONAL ASSESSMENT: PAIN_FUNCTIONAL_ASSESSMENT: 0-10

## 2025-04-03 ASSESSMENT — PAIN SCALES - GENERAL: PAINLEVEL_OUTOF10: 4

## 2025-04-03 NOTE — LETTER
April 3, 2025     Susy Mason DO  [address]  [address]  [facility] [address]    Patient: Faustino Isaacs Jr.   YOB: 1950   Date of Visit: 4/3/2025       Dear Dr. Susy Mason DO:    Thank you for referring Faustino Isaacs to me for evaluation. Below are my notes for this consultation.  If you have questions, please do not hesitate to call me. I look forward to following your patient along with you.       Sincerely,     Justin Giron MD      CC: No Recipients  ______________________________________________________________________________________    This is a consultation from Dr. Susy Mason DO for   Chief Complaint   Patient presents with   • Left Knee - Pain       This is a 75 y.o. male who presents for evaluation of left knee pain.  Patient states has had left knee pain for years on and off, is been recently exacerbated.  Complains of sharp pain over the medial knee worse with walking proving with rest.  He is very active and plays softball.  He has never had injections or surgery on that knee.  He occasionally takes an over-the-counter anti-inflammatory but not often.  He is wrapped in the past.  No numbness no tingling no fevers no chills no shooting pain down the leg.  Left knee exam: skin intact no lacerations or abrations. no effusion. nttp joint line. negative log roll negative patellar grind. ROM 0-120. stable to varus and valgus stress at 0 and 30 degrees. negative lachman negative posterior drawer negative butch. 5/5 ehl/fhl/gs/ta. silt s/s/sp/dp/t. 2+ dp/pt        Physical Exam    There has been no interval change in this patient's past medical, surgical, medications, allergies, family history or social history since the most recent visit to a provider within our department. 14 point review of systems was performed, reviewed, and negative except for pertinent positives documented in the history of present illness.     Constitutional: well developed, well nourished male in no  acute distress  Psychiatric: normal mood, appropriate affect  Eyes: sclera anicteric  HENT: normocephalic/atraumatic  CV: regular rate and rhythm   Respiratory: non labored breathing  Integumentary: no rash  Neurological: moves all extremities    Left knee exam: skin intact no lacerations or abrations. no effusion.  Tender medial joint line. negative log roll negative patellar grind. ROM 0-120. stable to varus and valgus stress at 0 and 30 degrees. negative lachman negative posterior drawer negative butch. 5/5 ehl/fhl/gs/ta. silt s/s/sp/dp/t. 2+ dp/pt        Imaging:  Xrays were ordered by me, they were reviewed and independently interpreted by me today, they show severe left knee arthritis bone-on-bone    L Inj/Asp: L knee on 4/3/2025 9:50 AM  Indications: pain and joint swelling  Details: 22 G needle, anterolateral approach  Medications: 1 mL triamcinolone acetonide 40 mg/mL    Discussion:  I discussed the conservative treatment options for knee osteoarthritis including but not limited to physical therapy, oral NSAIDS, activity and lifestyle modification, and corticosteroid injections. Pt has elected to undergo a cortisone injection today. I have explained the risk and benefits of an injection including the possibility of joint infection, bleeding, damage to cartilage, allergic reaction. Patient verbalized understanding and gave verbal consent wishes to proceed with a intra-articular cortisone injection for their knee.    Procedure:  After discussing the risk and benefits of the procedure, we proceeded with an intra-articular left knee injection. We discussed the risks and benefits and potential morbidity related to the treatment, and to the prescription medication administered in the injection    With the patient's informed verbal consent, the left knee was prepped in standard sterile fashion with Chlorhexidine. The skin was then anesthetized with ethyl chloride spray and cleaned again with Chlorhexidine. The  knee was then apirated/injected with a prefilled 20-gauge syringe of 40 mg Kenalog + 4 ml Lidocaine using the lateral approach without complications.  The patient tolerated this well and felt immediate initial relief of symptoms. A bandaid was applied and the patient ambulated out of the clinic on ther own accord without difficulty. Patient was instructed to avoid physical activity for 24-48 hours to prevent the knees from swelling and may ice the knees as tolerated. Patient should contact the office if any signs of of infection appear: redness, fever, chills, drainage, swelling or warmth to the knees.  Pt understands that the injections can be repeated no sooner than 3 months.  Procedure, treatment alternatives, risks and benefits explained, specific risks discussed. Consent was given by the patient. Immediately prior to procedure a time out was called to verify the correct patient, procedure, equipment, support staff and site/side marked as required. Patient was prepped and draped in the usual sterile fashion.             Impression/Plan: This is a 75 y.o. male with severe left knee arthritis.  I had an in depth discussion with the patient regarding treatment options for arthritis and their relative risks and benefits. We reviewed surgical and nonsurgical option for treatment. Treatments include anti inflammatory medications, physical therapy, weight loss, activity modification, use of assistive devices, injection therapies. We discussed current prescriptions and risks and benefits of continuation of prescription medication as apporpriate. We discussed that arthritis is often progressive over time, an in end stage arthritis surgical interventions can be considered, including arthroplasty. All questions were answered and the patient voiced their understanding.  Will get him started with injections physical therapy I will see him back as needed    BMI Readings from Last 1 Encounters:   03/19/25 27.24 kg/m²      Lab  "Results   Component Value Date    CREATININE 0.91 03/19/2025     Tobacco Use: Low Risk  (4/3/2025)    Patient History    • Smoking Tobacco Use: Never    • Smokeless Tobacco Use: Never    • Passive Exposure: Not on file      Computed MELD 3.0 unavailable. One or more values for this score either were not found within the given timeframe or did not fit some other criterion.  Computed MELD-Na unavailable. One or more values for this score either were not found within the given timeframe or did not fit some other criterion.       Lab Results   Component Value Date    HGBA1C 5.5 04/29/2019     No results found for: \"STAPHMRSASCR\"  "

## 2025-04-03 NOTE — PROGRESS NOTES
This is a consultation from Dr. Susy Mason DO for   Chief Complaint   Patient presents with    Left Knee - Pain       This is a 75 y.o. male who presents for evaluation of left knee pain.  Patient states has had left knee pain for years on and off, is been recently exacerbated.  Complains of sharp pain over the medial knee worse with walking proving with rest.  He is very active and plays softball.  He has never had injections or surgery on that knee.  He occasionally takes an over-the-counter anti-inflammatory but not often.  He is wrapped in the past.  No numbness no tingling no fevers no chills no shooting pain down the leg.  Left knee exam: skin intact no lacerations or abrations. no effusion. nttp joint line. negative log roll negative patellar grind. ROM 0-120. stable to varus and valgus stress at 0 and 30 degrees. negative lachman negative posterior drawer negative butch. 5/5 ehl/fhl/gs/ta. silt s/s/sp/dp/t. 2+ dp/pt        Physical Exam    There has been no interval change in this patient's past medical, surgical, medications, allergies, family history or social history since the most recent visit to a provider within our department. 14 point review of systems was performed, reviewed, and negative except for pertinent positives documented in the history of present illness.     Constitutional: well developed, well nourished male in no acute distress  Psychiatric: normal mood, appropriate affect  Eyes: sclera anicteric  HENT: normocephalic/atraumatic  CV: regular rate and rhythm   Respiratory: non labored breathing  Integumentary: no rash  Neurological: moves all extremities    Left knee exam: skin intact no lacerations or abrations. no effusion.  Tender medial joint line. negative log roll negative patellar grind. ROM 0-120. stable to varus and valgus stress at 0 and 30 degrees. negative lachman negative posterior drawer negative butch. 5/5 ehl/fhl/gs/ta. silt s/s/sp/dp/t. 2+  dp/pt        Imaging:  Xrays were ordered by me, they were reviewed and independently interpreted by me today, they show severe left knee arthritis bone-on-bone    L Inj/Asp: L knee on 4/3/2025 9:50 AM  Indications: pain and joint swelling  Details: 22 G needle, anterolateral approach  Medications: 1 mL triamcinolone acetonide 40 mg/mL    Discussion:  I discussed the conservative treatment options for knee osteoarthritis including but not limited to physical therapy, oral NSAIDS, activity and lifestyle modification, and corticosteroid injections. Pt has elected to undergo a cortisone injection today. I have explained the risk and benefits of an injection including the possibility of joint infection, bleeding, damage to cartilage, allergic reaction. Patient verbalized understanding and gave verbal consent wishes to proceed with a intra-articular cortisone injection for their knee.    Procedure:  After discussing the risk and benefits of the procedure, we proceeded with an intra-articular left knee injection. We discussed the risks and benefits and potential morbidity related to the treatment, and to the prescription medication administered in the injection    With the patient's informed verbal consent, the left knee was prepped in standard sterile fashion with Chlorhexidine. The skin was then anesthetized with ethyl chloride spray and cleaned again with Chlorhexidine. The knee was then apirated/injected with a prefilled 20-gauge syringe of 40 mg Kenalog + 4 ml Lidocaine using the lateral approach without complications.  The patient tolerated this well and felt immediate initial relief of symptoms. A bandaid was applied and the patient ambulated out of the clinic on ther own accord without difficulty. Patient was instructed to avoid physical activity for 24-48 hours to prevent the knees from swelling and may ice the knees as tolerated. Patient should contact the office if any signs of of infection appear: redness,  fever, chills, drainage, swelling or warmth to the knees.  Pt understands that the injections can be repeated no sooner than 3 months.  Procedure, treatment alternatives, risks and benefits explained, specific risks discussed. Consent was given by the patient. Immediately prior to procedure a time out was called to verify the correct patient, procedure, equipment, support staff and site/side marked as required. Patient was prepped and draped in the usual sterile fashion.             Impression/Plan: This is a 75 y.o. male with severe left knee arthritis.  I had an in depth discussion with the patient regarding treatment options for arthritis and their relative risks and benefits. We reviewed surgical and nonsurgical option for treatment. Treatments include anti inflammatory medications, physical therapy, weight loss, activity modification, use of assistive devices, injection therapies. We discussed current prescriptions and risks and benefits of continuation of prescription medication as apporpriate. We discussed that arthritis is often progressive over time, an in end stage arthritis surgical interventions can be considered, including arthroplasty. All questions were answered and the patient voiced their understanding.  Will get him started with injections physical therapy I will see him back as needed    BMI Readings from Last 1 Encounters:   03/19/25 27.24 kg/m²      Lab Results   Component Value Date    CREATININE 0.91 03/19/2025     Tobacco Use: Low Risk  (4/3/2025)    Patient History     Smoking Tobacco Use: Never     Smokeless Tobacco Use: Never     Passive Exposure: Not on file      Computed MELD 3.0 unavailable. One or more values for this score either were not found within the given timeframe or did not fit some other criterion.  Computed MELD-Na unavailable. One or more values for this score either were not found within the given timeframe or did not fit some other criterion.       Lab Results   Component  "Value Date    HGBA1C 5.5 04/29/2019     No results found for: \"STAPHMRSASCR\"  "

## 2025-04-18 ENCOUNTER — EVALUATION (OUTPATIENT)
Dept: PHYSICAL THERAPY | Facility: CLINIC | Age: 75
End: 2025-04-18
Payer: MEDICARE

## 2025-04-18 DIAGNOSIS — G89.29 CHRONIC PAIN OF LEFT KNEE: ICD-10-CM

## 2025-04-18 DIAGNOSIS — M25.562 CHRONIC PAIN OF LEFT KNEE: ICD-10-CM

## 2025-04-18 PROCEDURE — 97110 THERAPEUTIC EXERCISES: CPT | Mod: GP

## 2025-04-18 PROCEDURE — 97163 PT EVAL HIGH COMPLEX 45 MIN: CPT | Mod: GP

## 2025-04-18 SDOH — ECONOMIC STABILITY: GENERAL: QUALITY OF LIFE: FAIR

## 2025-04-18 ASSESSMENT — ENCOUNTER SYMPTOMS
QUALITY: TIGHT
ALLEVIATING FACTORS: REST
QUALITY: DISCOMFORT
PAIN SCALE AT HIGHEST: 8
QUALITY: SQUEEZING
PAIN SCALE: 5
EXACERBATED BY: LIFTING
QUALITY: THROBBING
ALLEVIATING FACTORS: MEDICATIONS
QUALITY: DULL ACHE
QUALITY: PRESSURE
ALLEVIATING FACTORS: CHANGE IN POSITION
EXACERBATED BY: MOVEMENT
PAIN SCALE AT LOWEST: 2

## 2025-04-18 NOTE — PROGRESS NOTES
Physical Therapy Evaluation and Treatment     Patient Name: Faustino Isaacs Jr.  MRN: [id number]  Encounter date: 2025  Time Calculation  Start Time: 845  Stop Time: 925  Time Calculation (min): 40 min  PT Evaluation Time Entry  PT Evaluation (Complex) Time Entry: 30  PT Therapeutic Procedures Time Entry  Therapeutic Exercise Time Entry: 15    Visit # 1 of Auth Required  Visits/Dates Authorized: AUTH IS REQUIRED 30 COPAY OOP 4150    Current Problem:   Problem List Items Addressed This Visit    None  Visit Diagnoses         Codes      Chronic pain of left knee     M25.562, G89.29    Relevant Orders    Follow Up In Physical Therapy          Precautions:          Steadi Fall Risk  One or more falls in the last year?    How many Times?    Was the patient injured in the fall?    Has trouble stepping onto curb?    Advised to use a cane or walker to get around safely?    Often has to rush to toilet?    Feels unsteady when walking?    Has lost some feeling in feet?    Often feels sad or depressed?    Steadies self on furniture while walking at home?    Takes medicine that makes them feel lightheaded or more tired than usual?    Worried about Falling?    Takes medicine to sleep or improve mood?    Needs to push with hands when rising from a chair?        Subjective Evaluation    History of Present Illness  Date of onset: 2025  Mechanism of injury: C/C - Left knee pain  Progressively worse (Med>Lat) joint line pain  Xray - FINDINGS:  No acute fracture. Varus angulation of the knee.    Severe medial compartment osteoarthrosis with joint space loss and  osteophytes. Moderate lateral and patellofemoral compartment  osteoarthrosis with osteophytes.    Small knee joint effusion.  Soft tissues are unremarkable.    IMPRESSION:  1. Severe medial and moderate lateral/patellofemoral compartment  osteoarthrosis.      Quality of life: fair    Pain  Current pain ratin  At best pain ratin  At worst pain ratin  Location:  Med left knee pain  Quality: dull ache, throbbing, tight, pressure, discomfort and squeezing  Relieving factors: medications, rest and change in position  Aggravating factors: lifting and movement  Progression: no change    Social Support  Lives in: one-story house  Lives with: spouse    Diagnostic Tests  X-ray: abnormal    Treatments  Previous treatment: medication  Current treatment: medication  Patient Goals  Patient goals for therapy: decreased pain, improved balance, increased motion, increased strength, independence with ADLs/IADLs and return to sport/leisure activities  Patient goal: Retired - Hobby - softball            Objective     Observations   Left Knee   Positive for atrophy, deformity, edema and effusion.     Neurological Testing     Sensation     Knee   Left Knee   Intact: Light touch    Reflexes   Left   Patellar (L4): trace (1+)  Achilles (S1): normal (2+)    Right   Patellar (L4): trace (1+)  Achilles (S1): normal (2+)    Palpation   Left   Tenderness of the distal biceps femoris, distal semimembranosus, distal semitendinosus, lateral gastrocnemius, medial gastrocnemius, rectus femoris, vastus lateralis and vastus medialis.   Trigger point to distal biceps femoris, distal semimembranosus, distal semitendinosus, lateral gastrocnemius, medial gastrocnemius, rectus femoris, vastus lateralis and vastus medialis.     Tenderness   Left Knee   Tenderness in the lateral joint line, medial joint line, patellar tendon and quadriceps tendon.     Active Range of Motion   Left Knee   Flexion: 101 degrees with pain  Prone flexion: 95 degrees with pain  Extension: 5 degrees     Passive Range of Motion   Left Knee   Flexion: 110 degrees   Extension: 2 degrees     Patellar Mobility   Left Knee Hypomobile in the left medial, left lateral, left superior and left inferior patellar tendon(s).     Patellar Static Positioning   Left Knee: lateral tilt and farheen  Right Knee: lateral tilt and farheen    Strength/Myotome  "Testing     Left Knee   Flexion: 4-  Prone flexion: 4-  Extension: 4-    Right Knee   Flexion: 4  Prone flexion: 4  Extension: 4    Tests     Left Knee   Positive patellar apprehension and patella-femoral grind.        Other Outcome Measures:   LEFS - not completed by patient  Treatments:     Therapeutic Exercise 77474:   Modified faustino x 3min R/L  Seated pendulum R/L 7.5# x 5min  Slant board 2x60\"  HEP / Access Codes:   Access Code: [email or web address]  URL: [email or web address]  Date: 04/18/2025  Prepared by: Seun Escobar    Exercises  - Supine Piriformis Stretch with Foot on Ground  - 1 x daily - 7 x weekly - 2 sets - 2 reps - 15 hold  - Supine Quad Set  - 1 x daily - 7 x weekly - 2 sets - 10 reps - 3 hold  - Supine Hip Adduction Isometric with Ball  - 1 x daily - 7 x weekly - 2 sets - 10 reps - 3 hold  - Seated Hamstring Stretch  - 1 x daily - 7 x weekly - 2 sets - 2 reps - 10 hold  - Supine Hamstring Stretch with Strap  - 1 x daily - 7 x weekly - 2 sets - 2 reps - 10 hold  - Modified Afustino Stretch  - 1 x daily - 7 x weekly - 1 sets - 2 reps - 180 hold    Assessment & Plan     Assessment  Impairments: abnormal gait, abnormal muscle firing, abnormal or restricted ROM, activity intolerance, impaired physical strength, lacks appropriate home exercise program, pain with function and weight-bearing intolerance  Prognosis: fair    Goals  Retired - Hobby - softball    Plan  Therapy options: will be seen for skilled physical therapy services  Planned therapy interventions: flexibility, functional ROM exercises, gait training, home exercise program, joint mobilization, manual therapy, neuromuscular re-education, soft tissue mobilization, strengthening, stretching and therapeutic activities  Frequency: 2x week  Duration in visits: 8  Duration in weeks: 4  Treatment plan discussed with: patient          Moderate complexity due to patient's clinical presentation being evolving with changing characteristics, " with comorbidities/complexities to include chronic left pain, all of which may negatively impact rehab tolerance and progression.     SUMMARY -   LEFT KNEE PAIN  Manual - PROM/Jt Mobs Left knee/hip  PRE's - knee/hips  Balance/proprio program  Seated distraction - AW  Home slant board  Recommend - aquatics       Goals: Frequency - 1-2x/wk x 4-6 wks    Goals:  SHORT TERM GOALS:  Patient will report decrease in pain from 8/10 to </= 1/10 to improve quality of life.   Patient will improve left knee AROM to WFL in order to improve gait mechanics and functional mobility  Patient will demonstrate sit to stand x10 without UE to demonstrate improved LE strength.  Patient will improve 0 score to </= 10 seconds to reduce fall risk.   Patient independent with prescribed HEP  Pt Education - Independent postural and  awareness and joint protection program  LONG TERM GOALS:  Patient will be independent with HEP to promote self management of condition  Patient will perform reciprocal stair negotiation to demonstrate improved LE strength.   Patient will ambulate with least restrictive device and proper gait mechanics to promote return to prior level of function.    Functional Level - reported % (hobbies/work/recreation)

## 2025-04-23 ENCOUNTER — TREATMENT (OUTPATIENT)
Dept: PHYSICAL THERAPY | Facility: CLINIC | Age: 75
End: 2025-04-23
Payer: MEDICARE

## 2025-04-23 DIAGNOSIS — G89.29 CHRONIC PAIN OF LEFT KNEE: Primary | ICD-10-CM

## 2025-04-23 DIAGNOSIS — M25.562 CHRONIC PAIN OF LEFT KNEE: Primary | ICD-10-CM

## 2025-04-23 PROCEDURE — 97110 THERAPEUTIC EXERCISES: CPT | Mod: GP,CQ

## 2025-04-23 PROCEDURE — 97140 MANUAL THERAPY 1/> REGIONS: CPT | Mod: GP,CQ

## 2025-04-23 PROCEDURE — 97112 NEUROMUSCULAR REEDUCATION: CPT | Mod: GP,CQ

## 2025-04-23 ASSESSMENT — PAIN SCALES - GENERAL: PAINLEVEL_OUTOF10: 1

## 2025-04-23 ASSESSMENT — PAIN - FUNCTIONAL ASSESSMENT: PAIN_FUNCTIONAL_ASSESSMENT: 0-10

## 2025-04-23 NOTE — PROGRESS NOTES
"Physical Therapy Treatment    Patient Name: Faustino Isaacs Jr.  MRN: [id number]  Today's Date: 4/23/2025    Current Problem  Problem List Items Addressed This Visit           ICD-10-CM    Chronic pain of left knee - Primary M25.562, G89.29       Insurance:  Payor: [employer] MEDICARE / Plan: [employer] MEDICARE ADVANTAGE / Product Type: *No Product type* /   Number of Treatments Authorized: 2/8  Certification Period Start Date: 04/18/25  Certification Period End Date: 06/16/25    Subjective   General  Reason for Referral: Chronic pain of left knee  Referred By: CHRIS LI MD  General Comment: PT STATES HE STILL HAS PAIN.  IT'S NOT TOO BAD CURRENTLY. HEP GOING WELL.    Performing HEP?: Yes    Precautions  Precautions  Precautions Comment: NONE  Pain  Pain Assessment: 0-10  0-10 (Numeric) Pain Score: 1  Pain Location: Knee  Pain Orientation: Left    Objective   General Observation  General Observation: L KNEE VALGUS    Treatments:    Therapeutic Exercise  Therapeutic Exercise Activity 1: SCIFIT MAN L2 X 6 MIN  Therapeutic Exercise Activity 2: GASTROC STRETCH X 1 MIN  Therapeutic Exercise Activity 3: HEEL RAISES X 1 MIN  Therapeutic Exercise Activity 4: DYNAMICS TIN SOLDIER, BUTT KICK, MARCH  Therapeutic Exercise Activity 5: FOOTWORM YELLOW LOOP 2 X 40'  Therapeutic Exercise Activity 6: MINI SQUATS X 1 MIN  Therapeutic Exercise Activity 7: BRIDGE HOLD 10\" X 2 MIN    Balance/Neuromuscular Re-Education  Balance/Neuromuscular Re-Education Activity 1: TILT BOARD BALANCING F/B, S/S X 2 MIN EACH // BAR    Manual Therapy  Manual Therapy Activity 1: L PF MOBS  Manual Therapy Activity 2: STRETCH L HAM, QUAD, GLUT, HIP FLEX  Manual Therapy Activity 3: PROM L KNEE FLEX, EXT  Manual Therapy Activity 4: MFR L LEG PULL  Manual Therapy Activity 5: K-TAPE L KNEE ()   H/O GIVEN                        OP EDUCATION:  Outpatient Education  Education Comment: Access Code: [email or web address]  URL: [email or web address]  Date: " Informed patient through my pattie of below. Informed patiet to call the office for any further questions.   04/23/2025  Prepared by: Prakash Mejia    Exercises  - Side Stepping with Resistance at Feet  - 1 x daily - 7 x weekly - 1 sets - 10-20 reps  - Mini Squat with Counter Support  - 1 x daily - 7 x weekly - 1 sets - 20 reps  - Supine Bridge  - 1 x daily - 7 x weekly - 1 sets - 20 reps  - Standing Heel Raise with Support  - 1 x daily - 7 x weekly - 1 sets - 20 reps    Assessment:  PT Assessment  Assessment Comment: PT RAQUEL EX'S WELL.  NO C/O INCREASED PAIN WITH THER EX.  PT CHALLENGED WITH BALANCING ACTIVITIES.  PT HAD DECREASED PAIN AFTER SESSION.    Plan:  OP PT Plan  PT Plan: Skilled PT (ASSESS K-TAPE NEXT VISIT)  Certification Period Start Date: 04/18/25  Certification Period End Date: 06/16/25  Number of Treatments Authorized: 2/8    Goals:       Time Calculation  Start Time: 0946  Stop Time: 1024  Time Calculation (min): 38 min  PT Therapeutic Procedures Time Entry  Manual Therapy Time Entry: 13  Neuromuscular Re-Education Time Entry: 5  Therapeutic Exercise Time Entry: 20,

## 2025-04-25 ENCOUNTER — TREATMENT (OUTPATIENT)
Dept: PHYSICAL THERAPY | Facility: CLINIC | Age: 75
End: 2025-04-25
Payer: MEDICARE

## 2025-04-25 DIAGNOSIS — M25.562 CHRONIC PAIN OF LEFT KNEE: ICD-10-CM

## 2025-04-25 DIAGNOSIS — G89.29 CHRONIC PAIN OF LEFT KNEE: ICD-10-CM

## 2025-04-25 PROCEDURE — 97110 THERAPEUTIC EXERCISES: CPT | Mod: GP

## 2025-04-25 PROCEDURE — 97140 MANUAL THERAPY 1/> REGIONS: CPT | Mod: GP

## 2025-04-25 ASSESSMENT — PAIN - FUNCTIONAL ASSESSMENT: PAIN_FUNCTIONAL_ASSESSMENT: 0-10

## 2025-04-25 ASSESSMENT — PAIN SCALES - GENERAL: PAINLEVEL_OUTOF10: 0 - NO PAIN

## 2025-04-25 NOTE — PROGRESS NOTES
"Physical Therapy Treatment    Patient Name: Faustino Isaacs Jr.  MRN: [id number]  Today's Date: 4/25/2025  Time Calculation  Start Time: 0845  Stop Time: 0930  Time Calculation (min): 45 min  PT Therapeutic Procedures Time Entry  Manual Therapy Time Entry: 15  Therapeutic Exercise Time Entry: 30    Insurance:  Visit number: Number of Treatments Authorized: 2/8  Authorization info: APPROVED AUTH. PT, 8 VISITS, 4/18/25 to 6/16/25-FILEMON NOEL    AUTH IS REQUIRED 30 COPAY OOP 4150  Insurance Type: Payor: [employer] MEDICARE / Plan: [employer] MEDICARE ADVANTAGE / Product Type: *No Product type* /     Current Problem   1. Chronic pain of left knee  Follow Up In Physical Therapy          Subjective   General   Reason for Referral: Chronic pain of left knee  Referred By: CHRIS LI MD  General Comment: PT STATES HE STILL HAS PAIN.  IT'S NOT TOO BAD CURRENTLY. HEP GOING WELL. (K-tape seemed to help)  Precautions:  Precautions  Precautions Comment: NONE  Pain   Pain Assessment: 0-10  0-10 (Numeric) Pain Score: 0 - No pain  Pain Location: Knee  Pain Orientation: Left  Post Treatment Pain Level 0/10    Objective     Treatments:  Therapeutic Exercise:  Therapeutic Exercise  Therapeutic Exercise Activity 1: SCIFIT MAN L2 X 6 MIN  Therapeutic Exercise Activity 2: GASTROC STRETCH X 1 MIN  Therapeutic Exercise Activity 3: HEEL RAISES X 1 MIN  Therapeutic Exercise Activity 4: DYNAMICS TIN SOLDIER, BUTT KICK, MARCH  Therapeutic Exercise Activity 5: modified faustino x 2min left  Therapeutic Exercise Activity 6: MINI SQUATS X 1 MIN  Therapeutic Exercise Activity 7: BRIDGE HOLD 10\" X 2 MIN  Therapeutic Exercise Activity 8: TG L7 3x10 (0 bands)  Therapeutic Exercise Activity 9: seated - LE Pendulum x 4min  7.5# R/L     Manual:  Manual Therapy  Manual Therapy Activity 1: L PF MOBS  Manual Therapy Activity 2: stretch Left HS/glute/psoas/quad/ITB  Manual Therapy Activity 3: PROM L knee flex/ext (end range)  Manual Therapy Activity 4: MFR L Leg " Pull    Assessment   Assessment:   PT Assessment  Assessment Comment: PT RAQUEL EX'S WELL.  NO C/O INCREASED PAIN WITH THER EX.  PT CHALLENGED WITH BALANCING ACTIVITIES.  PT HAD DECREASED PAIN AFTER SESSION.    Plan:   OP PT Plan  PT Plan: Skilled PT (ASSESS K-TAPE NEXT VISIT)  Certification Period Start Date: 04/18/25  Certification Period End Date: 06/16/25  Number of Treatments Authorized: 2/8    OP EDUCATION:  Outpatient Education  Education Comment: Access Code: [email or web address]  URL: [email or web address]  Date: 04/23/2025  Prepared by: Prakash Mejia    Exercises  - Side Stepping with Resistance at Feet  - 1 x daily - 7 x weekly - 1 sets - 10-20 reps  - Mini Squat with Counter Support  - 1 x daily - 7 x weekly - 1 sets - 20 reps  - Supine Bridge  - 1 x daily - 7 x weekly - 1 sets - 20 reps  - Standing Heel Raise with Support  - 1 x daily - 7 x weekly - 1 sets - 20 reps    Goals:

## 2025-04-28 ENCOUNTER — TREATMENT (OUTPATIENT)
Dept: PHYSICAL THERAPY | Facility: CLINIC | Age: 75
End: 2025-04-28
Payer: MEDICARE

## 2025-04-28 DIAGNOSIS — G89.29 CHRONIC PAIN OF LEFT KNEE: ICD-10-CM

## 2025-04-28 DIAGNOSIS — M25.562 CHRONIC PAIN OF LEFT KNEE: ICD-10-CM

## 2025-04-28 PROCEDURE — 97112 NEUROMUSCULAR REEDUCATION: CPT | Mod: GP,CQ

## 2025-04-28 PROCEDURE — 97110 THERAPEUTIC EXERCISES: CPT | Mod: GP,CQ

## 2025-04-28 ASSESSMENT — PAIN - FUNCTIONAL ASSESSMENT: PAIN_FUNCTIONAL_ASSESSMENT: 0-10

## 2025-04-28 ASSESSMENT — PAIN SCALES - GENERAL: PAINLEVEL_OUTOF10: 0 - NO PAIN

## 2025-04-28 NOTE — PROGRESS NOTES
"Physical Therapy Treatment    Patient Name: Faustino Isaacs Jr.  MRN: [id number]  Today's Date: 4/28/2025    Current Problem  Problem List Items Addressed This Visit           ICD-10-CM    Chronic pain of left knee M25.562, G89.29       Insurance:  Payor: [employer] MEDICARE / Plan: [employer] MEDICARE ADVANTAGE / Product Type: *No Product type* /   Number of Treatments Authorized: 4/8  Certification Period Start Date: 04/18/25  Certification Period End Date: 06/16/25    Subjective   General  Reason for Referral: Chronic pain of left knee  Referred By: CHRIS LI MD  General Comment: PT STATES HIS KNEE IS DOING OK SO FAR THIS MORNING.  HE CONTINUES TO PERFORM HEP.    Performing HEP?: Yes    Precautions  Precautions  Precautions Comment: NONE  Pain  Pain Assessment: 0-10  0-10 (Numeric) Pain Score: 0 - No pain  Pain Location: Knee  Pain Orientation: Left    Objective   General Observation  General Observation: L KNEE VALGUS    Treatments:    Therapeutic Exercise  Therapeutic Exercise Activity 1: SCIFIT MAN L2 X 6 MIN  Therapeutic Exercise Activity 2: GASTROC STRETCH X 1 MIN  Therapeutic Exercise Activity 3: HEEL RAISES 2 X 20  Therapeutic Exercise Activity 4: DYNAMICS TIN SOLDIER, BUTT KICK, MARCH  Therapeutic Exercise Activity 5: STANDING HIP FLEX STRETCH X 1 MIN EACH  Therapeutic Exercise Activity 6: FOOTWORM YELLOW LOOP 2 X 40'  Therapeutic Exercise Activity 7: TG SQUATS X 1 MIN  Therapeutic Exercise Activity 8: HAM CURLS 90# X 1 MIN  Therapeutic Exercise Activity 9: LAQ L/R 5# X 1 MIN  Therapeutic Exercise Activity 10: HL HIP ABD R/L BLACK TBAND X 2 MIN  Therapeutic Exercise Activity 11: BRIDGE HOLD 10\" X 2 MIN    Balance/Neuromuscular Re-Education  Balance/Neuromuscular Re-Education Activity 1: TILT BOARD BALANCING F/B, S/S X 2 MIN EACH // BAR  Balance/Neuromuscular Re-Education Activity 2: 6\" FWD STEP UP WITH HIGH KNEE // BAR X 2 MIN    Manual Therapy  Manual Therapy Performed: No                        OP " EDUCATION:  Outpatient Education  Education Comment: CONTINUE WITH CURRENT HEP    Assessment:  PT Assessment  Assessment Comment: PT RAQUEL EX'S WELL. NO C/O PAIN DURING WORKOUT.  PT DID SHOW L KNEE WEAKNESS WITH STEP UPS.  PT IS PROGRESSING TOWARDS GOALS.    Plan:  OP PT Plan  PT Plan: Skilled PT (ASSESS K-TAPE NEXT VISIT)  Certification Period Start Date: 04/18/25  Certification Period End Date: 06/16/25  Number of Treatments Authorized: 4/8    Goals:       Time Calculation  Start Time: 0956  Stop Time: 1034  Time Calculation (min): 38 min  PT Therapeutic Procedures Time Entry  Neuromuscular Re-Education Time Entry: 7  Therapeutic Exercise Time Entry: 31,

## 2025-04-30 ENCOUNTER — TREATMENT (OUTPATIENT)
Dept: PHYSICAL THERAPY | Facility: CLINIC | Age: 75
End: 2025-04-30
Payer: MEDICARE

## 2025-04-30 DIAGNOSIS — M25.562 CHRONIC PAIN OF LEFT KNEE: ICD-10-CM

## 2025-04-30 DIAGNOSIS — G89.29 CHRONIC PAIN OF LEFT KNEE: ICD-10-CM

## 2025-04-30 PROCEDURE — 97112 NEUROMUSCULAR REEDUCATION: CPT | Mod: GP,CQ

## 2025-04-30 PROCEDURE — 97110 THERAPEUTIC EXERCISES: CPT | Mod: GP,CQ

## 2025-04-30 ASSESSMENT — PAIN - FUNCTIONAL ASSESSMENT: PAIN_FUNCTIONAL_ASSESSMENT: 0-10

## 2025-04-30 ASSESSMENT — PAIN SCALES - GENERAL: PAINLEVEL_OUTOF10: 0 - NO PAIN

## 2025-04-30 NOTE — PROGRESS NOTES
Physical Therapy Treatment    Patient Name: Faustino Isaacs Jr.  MRN: [id number]  Today's Date: 4/30/2025    Current Problem  Problem List Items Addressed This Visit           ICD-10-CM    Chronic pain of left knee M25.562, G89.29       Insurance:  Payor: [employer] / Plan: [employer] MEDICARE ADVANTAGE / Product Type: *No Product type* /   Number of Treatments Authorized: 5/8  Certification Period Start Date: 04/18/25  Certification Period End Date: 06/16/25    Subjective   General  Reason for Referral: Chronic pain of left knee  Referred By: CHRIS LI MD  General Comment: PT STATES HIS KNEE IS DOING BETTER.  HE CONTINUES TO PERFORM HEP.    Performing HEP?: Yes    Precautions  Precautions  Precautions Comment: NONE  Pain  Pain Assessment: 0-10  0-10 (Numeric) Pain Score: 0 - No pain  Pain Location: Knee  Pain Orientation: Left    Objective   General Observation  General Observation: L KNEE VALGUS    Treatments:    Therapeutic Exercise  Therapeutic Exercise Activity 1: SCIFIT HILLS L3 X 6 MIN  Therapeutic Exercise Activity 2: GASTROC STRETCH X 2 MIN  Therapeutic Exercise Activity 3: INCLINE HEEL RAISES X 1 MIN  Therapeutic Exercise Activity 4: DYNAMICS TIN SOLDIER, BUTT KICK, MARCH  Therapeutic Exercise Activity 5: STANDING HIP FLEX STRETCH X 1 MIN EACH  Therapeutic Exercise Activity 6: FOOTWORM YELLOW LOOP 2 X 40', ZIG ZAG - MONSTER F/B X 40' EACH  Therapeutic Exercise Activity 7: TG SQUATS X 1 MIN  Therapeutic Exercise Activity 8: SCOOTER 4 X 40'  Therapeutic Exercise Activity 9: KNEE EXT 30# X 1 MIN    Balance/Neuromuscular Re-Education  Balance/Neuromuscular Re-Education Activity 1: BOSU FWD LUNGE X 2 MIN // BAR  Balance/Neuromuscular Re-Education Activity 2: TILT BOARD BALANCING F/B, S/S X 2 MIN EACH // BAR    Manual Therapy  Manual Therapy Performed: No                        OP EDUCATION:  Outpatient Education  Education Comment: CONTINUE WITH CURRENT HEP    Assessment:  PT Assessment  Assessment Comment:  PT RAQUEL EX'S WELL.  HE CONTINUES TO BE CHALLENGED WITH BALANCE ACTIVITIES.  NO C/O PAIN WITH WORKOUT.  PT IS PROGRESSING TOWARDS GOALS.    Plan:  OP PT Plan  PT Plan: Skilled PT (ASSESS K-TAPE NEXT VISIT)  Certification Period Start Date: 04/18/25  Certification Period End Date: 06/16/25  Number of Treatments Authorized: 5/8    Goals:       Time Calculation  Start Time: 0952  Stop Time: 1030  Time Calculation (min): 38 min  PT Therapeutic Procedures Time Entry  Neuromuscular Re-Education Time Entry: 7  Therapeutic Exercise Time Entry: 31,

## 2025-05-05 ENCOUNTER — TREATMENT (OUTPATIENT)
Dept: PHYSICAL THERAPY | Facility: CLINIC | Age: 75
End: 2025-05-05
Payer: MEDICARE

## 2025-05-05 DIAGNOSIS — G89.29 CHRONIC PAIN OF LEFT KNEE: ICD-10-CM

## 2025-05-05 DIAGNOSIS — M25.562 CHRONIC PAIN OF LEFT KNEE: ICD-10-CM

## 2025-05-05 PROCEDURE — 97110 THERAPEUTIC EXERCISES: CPT | Mod: GP,CQ

## 2025-05-05 PROCEDURE — 97112 NEUROMUSCULAR REEDUCATION: CPT | Mod: GP,CQ

## 2025-05-05 ASSESSMENT — PAIN SCALES - GENERAL: PAINLEVEL_OUTOF10: 0 - NO PAIN

## 2025-05-05 ASSESSMENT — PAIN - FUNCTIONAL ASSESSMENT: PAIN_FUNCTIONAL_ASSESSMENT: 0-10

## 2025-05-05 NOTE — PROGRESS NOTES
"Physical Therapy Treatment    Patient Name: Faustino Isaacs Jr.  MRN: [id number]  Today's Date: 5/5/2025    Current Problem  Problem List Items Addressed This Visit           ICD-10-CM    Chronic pain of left knee M25.562, G89.29       Insurance:  Payor: [employer] / Plan: [email or web address] MEDICARE ADVANTAGE / Product Type: *No Product type* /   Number of Treatments Authorized: 6/8  Certification Period Start Date: 04/18/25  Certification Period End Date: 06/16/25    Subjective   General  Reason for Referral: Chronic pain of left knee  Referred By: CHRIS LI MD  General Comment: PT STATES THAT OVERALL THE KNEE HAS IMPROVED.  HE WILL STILL GET PAIN IN DURING THE DAY / NIGHT, DEPENDING ON WHAT HE DOES.    Performing HEP?: Yes    Precautions  Precautions  Precautions Comment: NONE  Pain  Pain Assessment: 0-10  0-10 (Numeric) Pain Score: 0 - No pain  Pain Location: Knee  Pain Orientation: Left    Objective   General Observation  General Observation: L KNEE VALGUS    Treatments:    Therapeutic Exercise  Therapeutic Exercise Activity 1: SC[place]S L3 X 6 MIN  Therapeutic Exercise Activity 2: GASTROC STRETCH X 1 MIN  Therapeutic Exercise Activity 3: INCLINE HEEL RAISES X 1 MIN  Therapeutic Exercise Activity 4: DYNAMICS TIN SOLDIER, BUTT KICK, MARCH, HIP FLEX  Therapeutic Exercise Activity 5: STANDING HIP FLEX STRETCH X 1 MIN EACH  Therapeutic Exercise Activity 6: FOOTWORM YELLOW LOOP 2 X 40', ZIG ZAG - MONSTER F/B X 40' EACH  Therapeutic Exercise Activity 7: TG SQUATS YELLOW BAND X 1 MIN  Therapeutic Exercise Activity 8: SCOOTER 4 X 40'    Balance/Neuromuscular Re-Education  Balance/Neuromuscular Re-Education Activity 1: BOSU FWD LUNGE X 2 MIN // BAR  Balance/Neuromuscular Re-Education Activity 2: TILT BOARD BALANCING F/B, S/S X 2 MIN EACH // BAR  Balance/Neuromuscular Re-Education Activity 3: 6\" FWD STEP UP 2 X 1 MIN // BAR                             OP EDUCATION:  Outpatient Education  Education Comment: CONTINUE WITH " CURRENT HEP    Assessment:  PT Assessment  Assessment Comment: PT RAQUEL EX'S WELL. NOTED WEAKNESS IN HIS R / L LE WHEN PERFORMING STEP UPS.  PT CONTINUES TO BE CHALLENGED WITH BALANCING ACTIVITIES.  HE IS SHOWING IMPROVEMENT WITH HIS STRENGTH.    Plan:  OP PT Plan  PT Plan: Skilled PT (ASSESS K-TAPE NEXT VISIT)  Certification Period Start Date: 04/18/25  Certification Period End Date: 06/16/25  Number of Treatments Authorized: 6/8    Goals:       Time Calculation  Start Time: 0950  Stop Time: 1030  Time Calculation (min): 40 min  PT Therapeutic Procedures Time Entry  Neuromuscular Re-Education Time Entry: 9  Therapeutic Exercise Time Entry: 31,

## 2025-05-07 ENCOUNTER — TREATMENT (OUTPATIENT)
Dept: PHYSICAL THERAPY | Facility: CLINIC | Age: 75
End: 2025-05-07
Payer: MEDICARE

## 2025-05-07 DIAGNOSIS — G89.29 CHRONIC PAIN OF LEFT KNEE: ICD-10-CM

## 2025-05-07 DIAGNOSIS — M25.562 CHRONIC PAIN OF LEFT KNEE: ICD-10-CM

## 2025-05-07 PROCEDURE — 97112 NEUROMUSCULAR REEDUCATION: CPT | Mod: GP,CQ

## 2025-05-07 PROCEDURE — 97110 THERAPEUTIC EXERCISES: CPT | Mod: GP,CQ

## 2025-05-07 ASSESSMENT — PAIN SCALES - GENERAL: PAINLEVEL_OUTOF10: 0 - NO PAIN

## 2025-05-07 ASSESSMENT — PAIN - FUNCTIONAL ASSESSMENT: PAIN_FUNCTIONAL_ASSESSMENT: 0-10

## 2025-05-07 NOTE — PROGRESS NOTES
"Physical Therapy Treatment    Patient Name: Faustino Isaacs Jr.  MRN: [id number]  Today's Date: 5/7/2025    Current Problem  Problem List Items Addressed This Visit           ICD-10-CM    Chronic pain of left knee M25.562, G89.29       Insurance:  Payor: [employer] / Plan: [employer] MEDICARE ADVANTAGE / Product Type: *No Product type* /   Number of Treatments Authorized: 7/8  Certification Period Start Date: 04/18/25  Certification Period End Date: 06/16/25    Subjective   General  Reason for Referral: Chronic pain of left knee  Referred By: CHRIS LI MD  General Comment: PT STATES HE'S BEEN TRYING TO GET HIS YARD WORK DONE.    Performing HEP?: Yes    Precautions  Precautions  Precautions Comment: NONE  Pain  Pain Assessment: 0-10  0-10 (Numeric) Pain Score: 0 - No pain  Pain Location: Knee  Pain Orientation: Left    Objective   General Observation  General Observation: L KNEE VALGUS    Treatments:    Therapeutic Exercise  Therapeutic Exercise Activity 1: SCIF[place] H[place]S L5 X 6 MIN  Therapeutic Exercise Activity 2: GASTROC STRETCH X 1 MIN  Therapeutic Exercise Activity 3: HEEL RAISES 10# KB X 1 MIN  Therapeutic Exercise Activity 4: DYNAMICS TIN SOLDIER, BUTT KICK, MARCH, HIP FLEX  Therapeutic Exercise Activity 5: STANDING HIP FLEX STRETCH X 1 MIN EACH  Therapeutic Exercise Activity 6: FOOTWORM YELLOW LOOP 2 X 40', ZIG ZAG - MONSTER F/B X 40' EACH  Therapeutic Exercise Activity 7: LEG PRESS R/L 40# X 20, 60# X 15, 80# X 10 EACH  Therapeutic Exercise Activity 8: SCOOTER 4 X 40'    Balance/Neuromuscular Re-Education  Balance/Neuromuscular Re-Education Activity 1: AI[employer] BEAM BALANCING WITH HEAD TURNS X 2 MIN // BAR  Balance/Neuromuscular Re-Education Activity 2: AIREX BEAM SIDE STEP ALONG // BAR X 2 MIN  Balance/Neuromuscular Re-Education Activity 3: 4\" STEPPER LAT STEP OVERS X 2 MIN // BAR  Balance/Neuromuscular Re-Education Activity 4: BOSU FWD LUNGE X 2 MIN // BAR                             OP " EDUCATION:  Outpatient Education  Education Comment: CONTINUE WITH CURRENT HEP    Assessment:  PT Assessment  Assessment Comment: PT RAQUEL EX'S WELL.  HE CONTINUES TO HAVE DIFFICULTY WITH PERFORMING STEP UPS AND LAT STEP OVERS. PT IS PROGRESSING TOWARDS GOALS.    Plan:  OP PT Plan  PT Plan: Skilled PT (ASSESS K-TAPE NEXT VISIT)  Certification Period Start Date: 04/18/25  Certification Period End Date: 06/16/25  Number of Treatments Authorized: 7/8    Goals:       Time Calculation  Start Time: 1303  Stop Time: 1343  Time Calculation (min): 40 min  PT Therapeutic Procedures Time Entry  Neuromuscular Re-Education Time Entry: 9  Therapeutic Exercise Time Entry: 31,

## 2025-05-19 ENCOUNTER — TREATMENT (OUTPATIENT)
Dept: PHYSICAL THERAPY | Facility: CLINIC | Age: 75
End: 2025-05-19
Payer: MEDICARE

## 2025-05-19 DIAGNOSIS — M25.562 CHRONIC PAIN OF LEFT KNEE: ICD-10-CM

## 2025-05-19 DIAGNOSIS — G89.29 CHRONIC PAIN OF LEFT KNEE: ICD-10-CM

## 2025-05-19 PROCEDURE — 97110 THERAPEUTIC EXERCISES: CPT | Mod: GP

## 2025-05-19 PROCEDURE — 97140 MANUAL THERAPY 1/> REGIONS: CPT | Mod: GP

## 2025-05-19 ASSESSMENT — PAIN SCALES - GENERAL: PAINLEVEL_OUTOF10: 0 - NO PAIN

## 2025-05-19 ASSESSMENT — PAIN - FUNCTIONAL ASSESSMENT: PAIN_FUNCTIONAL_ASSESSMENT: 0-10

## 2025-05-19 NOTE — PROGRESS NOTES
2017  EMPLOYEE INFORMATION: EMPLOYER INFORMATION:   NAME: [employer]   : 1965    DATE OF INJURY/EVENT: 2017           Location: [facility]   Treating Provider: ADELINE Mccloud  Time In:  12:33 PM Time Out:  1:36 PM      DIAGNOSIS:   1. Right inguinal hernia      STATUS: This injury is determined to be WORK RELATED.    RETURN TO WORK:   Mr. Ahuja may return to work with restrictions   on 2017             RESTRICTIONS:   Restrictions are to be followed at work and at home. Restrictions are in effect until next follow-up visit or seen by General Surgery. Limit to 10 pounds lift, push, pull, and carry. No squatting.        TREATMENT PLAN:  Medications for this injury/condition:   None  Referral/Consult:  General Surgical Services:  Start        Diagnostic Testing:   None         Instructions:   Please follow with General Surgery for evaluation and treatment.     NEXT RETURN VISIT: PRN  FOLLOW UP: Return Will follow with General Surgery.   Thank you for the privilege of providing medical care for this injury/condition.  If there are any questions, please call the occupational health clinic at Dept: [phone].    Electronically signed on 2017 at 1:28 PM by:   ADELINE Mccloud   [facility]    The physician below agrees with the plan and restrictions placed on the patient by the provider above.  Kelley Nayak DO     Physical Therapy Treatment    Patient Name: Faustino Isaacs Jr.  MRN: [id number]  Today's Date: 5/19/2025       Insurance:  Visit number: Number of Treatments Authorized: 8/8  Authorization info: APPROVED AUTH. PT, 8 VISITS, 4/18/25 to 6/16/25-FILEMON NOEL    AUTH IS REQUIRED 30 COPAY OOP 4150  Insurance Type: Payor: [employer] MEDICARE / Plan: [facility] MEDICARE ADVANTAGE / Product Type: *No Product type* /     Current Problem   1. Chronic pain of left knee  Follow Up In Physical Therapy          Subjective   General   Reason for Referral: Chronic pain of left knee  Referred By: CHRIS LI MD  General Comment: PT STATES HE'S BEEN TRYING TO GET HIS YARD WORK DONE.  Precautions:  Precautions  Precautions Comment: NONE  Pain   Pain Assessment: 0-10  0-10 (Numeric) Pain Score: 0 - No pain  Pain Location: Knee  Pain Orientation: Left  Post Treatment Pain Level 0/10    Objective   AROM - Left knee flex = 120 Ext = -10  MMT - Left Knee flex = 4+ Ext = 4+    Treatments:  Therapeutic Exercise:  Therapeutic Exercise  Therapeutic Exercise Activity 1: SCIFIT HILLS L5 X 6 MIN  Therapeutic Exercise Activity 2: GASTROC STRETCH X 1 MIN  Therapeutic Exercise Activity 3: HEEL RAISES 10# KB X 1 MIN  Therapeutic Exercise Activity 4: DYNAMICS TIN SOLDIER, BUTT KICK, MARCH, HIP FLEX  Therapeutic Exercise Activity 5: STANDING HIP FLEX STRETCH X 1 MIN EACH  Therapeutic Exercise Activity 6: FOOTWORM YELLOW LOOP 2 X 40', ZIG ZAG - MONSTER F/B X 40' EACH  Therapeutic Exercise Activity 7: TG - 3x15 (0 bands)  Therapeutic Exercise Activity 8: SCOOTER 4 X 40'     Manual:   Assessment   Assessment:   PT Assessment  Assessment Comment: Improved tolerance for gait, step activities.AROM/MMT = WNL's    Plan:   OP PT Plan  PT Plan: Skilled PT (ASSESS K-TAPE NEXT VISIT)  Certification Period Start Date: 04/18/25  Certification Period End Date: 06/16/25  Number of Treatments Authorized: 8/8    OP EDUCATION:  Outpatient Education  Education Comment: Continue  with HEP -    Goals:     SHORT TERM GOALS:  Patient will report decrease in pain from 8/10 to </= 1/10 to improve quality of life.  A  Patient will improve left knee AROM to WFL in order to improve gait mechanics and functional mobility A  Patient will demonstrate sit to stand x10 without UE to demonstrate improved LE strength. A  Patient will improve 0 score to </= 10 seconds to reduce fall risk. A  Patient independent with prescribed HEP A  Pt Education - Independent postural and  awareness and joint protection program A  LONG TERM GOALS:  Patient will be independent with HEP to promote self management of condition A  Patient will perform reciprocal stair negotiation to demonstrate improved LE strength. A   Patient will ambulate with least restrictive device and proper gait mechanics to promote return to prior level of function.  A  Functional Level - reported % (hobbies/work/recreation) A

## 2025-05-21 ENCOUNTER — APPOINTMENT (OUTPATIENT)
Dept: PHYSICAL THERAPY | Facility: CLINIC | Age: 75
End: 2025-05-21
Payer: MEDICARE

## 2025-05-27 ENCOUNTER — APPOINTMENT (OUTPATIENT)
Dept: PHYSICAL THERAPY | Facility: CLINIC | Age: 75
End: 2025-05-27
Payer: MEDICARE

## 2025-05-27 LAB — PSA SERPL-MCNC: 1.54 NG/ML

## 2025-05-29 ENCOUNTER — APPOINTMENT (OUTPATIENT)
Dept: PHYSICAL THERAPY | Facility: CLINIC | Age: 75
End: 2025-05-29
Payer: MEDICARE

## 2025-06-01 DIAGNOSIS — C61 MALIGNANT NEOPLASM OF PROSTATE (MULTI): ICD-10-CM

## 2025-06-15 NOTE — PROGRESS NOTES
Subjective   Patient ID: Faustino Isaacs Jr. is a 75 y.o. male.      HPI  75 y.o. male presents for a follow up visit status post TRUS fusion prostate biopsy completed on 02/04/2025 as part of active surveillance. Final pathology detailed a prostatic adenocarcinoma Treece score 3+4=7, GG2. PSA level 1.54.    He has a history of GG2 prostate cancer with a GPS score of 41, meaning a 52% chance of adverse pathological findings on pathology.      He is currently prescribed Cialis 5 mg daily. He states that he is satisfied with the urinary symptoms. However, with the sexual function he indicated that improvement can be made.     Lab Results   Component Value Date    PSA 1.54 05/27/2025    PSA 1.83 06/04/2024    PSA 2.18 12/01/2023    PSA 1.30 05/30/2023    PSA 3.4 10/04/2022         Surgical Pathology Exam completed on 02/04/2025:  FINAL DIAGNOSIS   A. Prostate, region of interest #1, biopsy:  -- Prostatic adenocarcinoma, Win score 3+4=7 (Grade group 2), involving three of five cores and approximately 10% of the specimen     Note: Treece pattern 4 comprises approximately less than 5% of the overall tumor volume. Cribriform growth is not identified.     B. Prostate, right base, biopsy:  -- Benign prostatic tissue     C. Prostate, right mid, biopsy:  -- Focal high-grade prostatic intraepithelial neoplasia (HGPIN)     D. Prostate, right apex, biopsy:  -- Benign prostatic tissue     E. Prostate, left base, biopsy:  -- Prostatic adenocarcinoma, Treece score 3+3=6 (Grade group 1), discontinuously involving two of two cores and approximately 65% of the specimen     F. Prostate, left mid, biopsy:  -- Prostatic adenocarcinoma, Treece score 3+3=6 (Grade group 1), involving one of two cores and approximately 8% of the specimen     G. Prostate, left apex, biopsy:  -- Benign prostatic tissue     MR PROSTATE WITH ESTELA BOUNDARIES IF PIRADS 3 OR ABOVE; 12/2/2024   PROSTATE VOLUME:  The prostate measures  5.7 cm x  3.6 cm  x  4.1 cm  in right-to-left,  anterior-posterior and craniocaudal dimension.  Prostate weight is estimated at 44.1g. PSA density is 0.04 ng/mL/g.  IMPRESSION:  1. Stable PI-RADS 4 lesion within the left posterolateral peripheral  zone at the level of the mid-gland.    Review of Systems  A complete review of systems was performed. All systems are noted to be negative unless indicated in the history of present illness, impression, active problem list, or past histories.     Objective   Physical Exam    Assessment/Plan   Diagnoses and all orders for this visit:  Malignant neoplasm of prostate (Multi)  -     Prostate Specific Antigen; Future  Benign prostatic hyperplasia with lower urinary tract symptoms, symptom details unspecified  -     tadalafil (Cialis) 5 mg tablet; Take 1 tablet (5 mg) by mouth once daily.  Erectile dysfunction, unspecified erectile dysfunction type  -     tadalafil 20 mg tablet; Take 1 tablet (20 mg) by mouth if needed for erectile dysfunction (take 1 pill 1-2hrs before sexual acitivty).      75 y.o. male presents for a follow up visit status post TRUS fusion prostate biopsy completed on 02/04/2025 as part of active surveillance. Final pathology detailed a prostatic adenocarcinoma Broseley score 3+4=7, GG2. PSA level 1.54.    I personally reviewed the PSA level that is 1.54. I am not concerned at this time as it is within a safe range. The PSA level can fluctuate occasionally.     I reviewed the active surveillance protocol for prostate cancer. Active surveillance is typically reserved for low-risk GG1 prostate cancer as well as very low volume GG2 if patient is comfortable. This will include serial PSA's, physical exam including ARNAV, serial MRI every 12-15 months or if PSA significantly rises, and repeat biopsies if indicated because of any worsening parameter.    I recommend that the patient undergo a repeat PSA level 6 months from his last PSA level test. His next PSA level will be ordered for the end of  11/2025.    The patient returns today with erectile dysfunction which has not had significant improvement on Cialis 5 mg daily. As such, I recommended trying Cialis 20 mg PRN in addition. He will contact us if he continues to struggle with the medication.        Plan:  Continue Cialis 5 mg daily   Start Cialis 20 mg PRN prior to intercourse.    Continue active surveillance for low volume stable GG2 PCa with PSA level end of 11/2025  FUV with urology in 12/2025    Scribe Attestation   By signing my name below, I, Allison Guzmán, Florentino attestation that this documentation has been prepared under the direction and in the presence of Kalin Flores MD.

## 2025-06-16 ENCOUNTER — APPOINTMENT (OUTPATIENT)
Dept: UROLOGY | Facility: CLINIC | Age: 75
End: 2025-06-16
Payer: MEDICARE

## 2025-06-16 DIAGNOSIS — N40.1 BENIGN PROSTATIC HYPERPLASIA WITH LOWER URINARY TRACT SYMPTOMS, SYMPTOM DETAILS UNSPECIFIED: ICD-10-CM

## 2025-06-16 DIAGNOSIS — C61 MALIGNANT NEOPLASM OF PROSTATE (MULTI): ICD-10-CM

## 2025-06-16 DIAGNOSIS — N52.9 ERECTILE DYSFUNCTION, UNSPECIFIED ERECTILE DYSFUNCTION TYPE: ICD-10-CM

## 2025-06-16 PROCEDURE — 1159F MED LIST DOCD IN RCRD: CPT | Performed by: STUDENT IN AN ORGANIZED HEALTH CARE EDUCATION/TRAINING PROGRAM

## 2025-06-16 PROCEDURE — G2211 COMPLEX E/M VISIT ADD ON: HCPCS | Performed by: STUDENT IN AN ORGANIZED HEALTH CARE EDUCATION/TRAINING PROGRAM

## 2025-06-16 PROCEDURE — 1036F TOBACCO NON-USER: CPT | Performed by: STUDENT IN AN ORGANIZED HEALTH CARE EDUCATION/TRAINING PROGRAM

## 2025-06-16 PROCEDURE — 1126F AMNT PAIN NOTED NONE PRSNT: CPT | Performed by: STUDENT IN AN ORGANIZED HEALTH CARE EDUCATION/TRAINING PROGRAM

## 2025-06-16 PROCEDURE — 99214 OFFICE O/P EST MOD 30 MIN: CPT | Performed by: STUDENT IN AN ORGANIZED HEALTH CARE EDUCATION/TRAINING PROGRAM

## 2025-06-16 RX ORDER — TADALAFIL 5 MG/1
5 TABLET ORAL DAILY
Qty: 30 TABLET | Refills: 11 | Status: SHIPPED | OUTPATIENT
Start: 2025-06-16 | End: 2026-06-16

## 2025-06-16 RX ORDER — TADALAFIL 20 MG/1
20 TABLET ORAL AS NEEDED
Qty: 10 TABLET | Refills: 11 | Status: SHIPPED | OUTPATIENT
Start: 2025-06-16 | End: 2026-06-16

## 2025-06-16 ASSESSMENT — PAIN SCALES - GENERAL: PAINLEVEL_OUTOF10: 0-NO PAIN

## 2025-07-17 DIAGNOSIS — I10 BENIGN ESSENTIAL HYPERTENSION: ICD-10-CM

## 2025-07-20 RX ORDER — LISINOPRIL 10 MG/1
10 TABLET ORAL DAILY
Qty: 90 TABLET | Refills: 1 | Status: SHIPPED | OUTPATIENT
Start: 2025-07-20

## 2025-09-18 ENCOUNTER — APPOINTMENT (OUTPATIENT)
Dept: PRIMARY CARE | Facility: CLINIC | Age: 75
End: 2025-09-18
Payer: MEDICARE

## (undated) DEVICE — PROBE HOLDER, URONAV BK 8808E/8818

## (undated) DEVICE — MARKER, SKIN, DUAL TIP INK W/9 LABEL AND REMOVABLE TIME OUT SLEEVE

## (undated) DEVICE — SYRINGE, 10 CC, LUER LOCK

## (undated) DEVICE — DRESSING, NON-ADHERENT, TELFA, OUCHLESS, 3 X 8 IN, STERILE

## (undated) DEVICE — PROBE COVER, ULTRASOUND 8818

## (undated) DEVICE — NEEDLE GUIDE, BIOPSY, ULTRASOUND, SINGLE, UA 1322-S

## (undated) DEVICE — TOWEL PACK, STERILE, 4/PACK, BLUE

## (undated) DEVICE — NEEDLE, SPINAL, QUINCKE, 22 G X 7 IN, BLACK HUB

## (undated) DEVICE — COVER, TABLE, 44X90

## (undated) DEVICE — NEEDLE, BIOPSY, MAX CORE, 18G

## (undated) DEVICE — CONTAINER, SPECIMEN, 120ML